# Patient Record
Sex: FEMALE | Race: WHITE
[De-identification: names, ages, dates, MRNs, and addresses within clinical notes are randomized per-mention and may not be internally consistent; named-entity substitution may affect disease eponyms.]

---

## 2018-09-25 ENCOUNTER — HOSPITAL ENCOUNTER (OUTPATIENT)
Dept: HOSPITAL 47 - LABPAT | Age: 63
End: 2018-09-25
Attending: ORTHOPAEDIC SURGERY
Payer: COMMERCIAL

## 2018-09-25 DIAGNOSIS — Z01.812: ICD-10-CM

## 2018-09-25 DIAGNOSIS — Z01.818: Primary | ICD-10-CM

## 2018-09-25 LAB
ALBUMIN SERPL-MCNC: 3.9 G/DL (ref 3.5–5)
ALP SERPL-CCNC: 82 U/L (ref 38–126)
ALT SERPL-CCNC: 18 U/L (ref 9–52)
ANION GAP SERPL CALC-SCNC: 6 MMOL/L
APTT BLD: 23 SEC (ref 22–30)
AST SERPL-CCNC: 25 U/L (ref 14–36)
BUN SERPL-SCNC: 14 MG/DL (ref 7–17)
CALCIUM SPEC-MCNC: 9.8 MG/DL (ref 8.4–10.2)
CHLORIDE SERPL-SCNC: 109 MMOL/L (ref 98–107)
CO2 SERPL-SCNC: 27 MMOL/L (ref 22–30)
ERYTHROCYTE [DISTWIDTH] IN BLOOD BY AUTOMATED COUNT: 4.91 M/UL (ref 3.8–5.4)
ERYTHROCYTE [DISTWIDTH] IN BLOOD: 12.9 % (ref 11.5–15.5)
GLUCOSE SERPL-MCNC: 99 MG/DL (ref 74–99)
HCT VFR BLD AUTO: 45.1 % (ref 34–46)
HGB BLD-MCNC: 14.9 GM/DL (ref 11.4–16)
INR PPP: 1.1 (ref ?–1.2)
MCH RBC QN AUTO: 30.4 PG (ref 25–35)
MCHC RBC AUTO-ENTMCNC: 33.1 G/DL (ref 31–37)
MCV RBC AUTO: 91.9 FL (ref 80–100)
PH UR: 6 [PH] (ref 5–8)
PLATELET # BLD AUTO: 235 K/UL (ref 150–450)
POTASSIUM SERPL-SCNC: 4.7 MMOL/L (ref 3.5–5.1)
PROT SERPL-MCNC: 6.8 G/DL (ref 6.3–8.2)
PT BLD: 10.5 SEC (ref 9–12)
SODIUM SERPL-SCNC: 142 MMOL/L (ref 137–145)
SP GR UR: 1.02 (ref 1–1.03)
UROBILINOGEN UR QL STRIP: <2 MG/DL (ref ?–2)
WBC # BLD AUTO: 6.4 K/UL (ref 3.8–10.6)

## 2018-09-25 PROCEDURE — 85027 COMPLETE CBC AUTOMATED: CPT

## 2018-09-25 PROCEDURE — 36415 COLL VENOUS BLD VENIPUNCTURE: CPT

## 2018-09-25 PROCEDURE — 85730 THROMBOPLASTIN TIME PARTIAL: CPT

## 2018-09-25 PROCEDURE — 80053 COMPREHEN METABOLIC PANEL: CPT

## 2018-09-25 PROCEDURE — 93005 ELECTROCARDIOGRAM TRACING: CPT

## 2018-09-25 PROCEDURE — 85610 PROTHROMBIN TIME: CPT

## 2018-09-25 PROCEDURE — 81003 URINALYSIS AUTO W/O SCOPE: CPT

## 2018-09-25 PROCEDURE — 87070 CULTURE OTHR SPECIMN AEROBIC: CPT

## 2018-10-04 ENCOUNTER — HOSPITAL ENCOUNTER (INPATIENT)
Dept: HOSPITAL 47 - 2ORMAIN | Age: 63
LOS: 1 days | Discharge: HOME HEALTH | DRG: 470 | End: 2018-10-05
Attending: ORTHOPAEDIC SURGERY | Admitting: ORTHOPAEDIC SURGERY
Payer: COMMERCIAL

## 2018-10-04 VITALS — BODY MASS INDEX: 32.3 KG/M2

## 2018-10-04 DIAGNOSIS — Z82.49: ICD-10-CM

## 2018-10-04 DIAGNOSIS — Z88.0: ICD-10-CM

## 2018-10-04 DIAGNOSIS — Z90.710: ICD-10-CM

## 2018-10-04 DIAGNOSIS — E78.5: ICD-10-CM

## 2018-10-04 DIAGNOSIS — Z79.899: ICD-10-CM

## 2018-10-04 DIAGNOSIS — M17.11: Primary | ICD-10-CM

## 2018-10-04 DIAGNOSIS — I95.9: ICD-10-CM

## 2018-10-04 DIAGNOSIS — E66.9: ICD-10-CM

## 2018-10-04 PROCEDURE — 0SRC0J9 REPLACEMENT OF RIGHT KNEE JOINT WITH SYNTHETIC SUBSTITUTE, CEMENTED, OPEN APPROACH: ICD-10-PCS

## 2018-10-04 PROCEDURE — 88300 SURGICAL PATH GROSS: CPT

## 2018-10-04 PROCEDURE — 85025 COMPLETE CBC W/AUTO DIFF WBC: CPT

## 2018-10-04 RX ADMIN — ASPIRIN 325 MG ORAL TABLET SCH MG: 325 PILL ORAL at 20:16

## 2018-10-04 RX ADMIN — CEFAZOLIN SCH GM: 10 INJECTION, POWDER, FOR SOLUTION INTRAVENOUS at 20:16

## 2018-10-04 RX ADMIN — DIPHENHYDRAMINE HYDROCHLORIDE PRN MG: 50 INJECTION, SOLUTION INTRAMUSCULAR; INTRAVENOUS at 20:16

## 2018-10-04 NOTE — P.CON
Consult Note





- .


Consult date: 10/04/18


Assessment/Plan:: 





Patient of Dr. Sarwat ABEBE was consulted for medical management, postoperative.





Patient is hypotensive/normotensive blood pressure 102/67 and 101/66, IV fluid 

changes 2.9 normal saline the same rate 75 mL an hour meanwhile discontinue the 

lactated Ringer.





Margie Stover 63 years old white female has 2 daughter and 1 son she came in 

electively for right total knee arthroplasty done by Dr. Eduar Lewis today 

subsequently consult for medical management.


Patient stated that she had arthritis in both knee but the right knee is worse 

and that is why she had the surgery.


She denied any history of diabetes mellitus, hypertension, hypotension, 

medication for any of other reasons.


ALLERGY to penicillin and amoxicillin.


No previous history of surgical intervention.  


She is .


Family history noncontributory.


Review of system negative in the 14 point.  Except the arthritis of the right 

knee.


On exam:


HEENT: Head was normocephalic and atraumatic,.  Pupil equal reactive 

conjunctiva pink sclera was nonicteric.  Normal hearing oropharynx natural 

teeth uvula midline, normal


Neck supple no JVD no thyromegaly no lymphadenopathy trachea midline.


Chest: Clear to auscultation and percussion


Heart regular sinus rhythm.


Abdomen is soft positive bowel sounds.


Extremities: Right total knee arthroplasty, left knee advanced degenerative 

arthritis.  No edema.


Vital sign at the time of exam temperature 98.3 pulse 65 rate 16 and blood 

pressure 102/67.  She is conscious alert oriented no dizziness or blurred 

vision.





Assessment and plan:


Blood pressure on the low side however is normal perfusing 102/67, plan change 

of the IV 2.9 normal saline 75 mL.


Check lab in the morning if wasn't ordered by Dr. recinos.


Anticoagulation.  The orthopedic surgeon.

## 2018-10-04 NOTE — XR
Limited right knee

 

HISTORY: Status post right knee arthroplasty

 

2 views of the right knee

 

Patient is status post right knee arthroplasty. There is anatomic alignment. Lucency present in the s
oft tissues is compatible with postop state. Ossific densities posterior to the distal femur and at t
he level of the posterior aspect of the femoral prosthesis are indeterminate, difficult to exclude lo
ose bodies.

 

IMPRESSION: Difficult to exclude loose bodies. Postop alignment is satisfactory.

 

A Yellow level critical message alert has been initiated for Eulogio Thurston via the Home-Account System on 10/4/2018 4:31 PM.  This message alert has been sent to Eulogio Thurston via 
e preferences provided by the clinician for the receipt of Radiology Critical Findings. Message ID [id number]
[id number].

## 2018-10-04 NOTE — OP
OPERATIVE REPORT



DATE OF PROCEDURE:

10/04/2018



PREOPERATIVE DIAGNOSIS:

Right knee osteoarthrosis.



POSTOPERATIVE DIAGNOSIS:

Right knee osteoarthrosis.



OPERATION:

Right total knee arthroplasty.



SURGEON:

Eduar Lewis MD



ASSISTANT:

Eulogio Thurston PA-C



ANESTHESIA:

Spinal with sedation.



ESTIMATED BLOOD LOSS:

100 mL.



TOURNIQUET TIME:

45 minutes at 250 mmHg.



COMPLICATIONS:

None apparent.



DRAINS:

None.



DISPOSITION:

Post-Anesthesia Care Unit.



INDICATIONS:

Margie is a very pleasant 63-year-old female with a long-standing history of right knee

pain.  History and physical examination are consistent with advanced right knee

osteoarthrosis.  She has been through significant non-operative management up to this

point.  Further treatment options were discussed and she decided to go forward with

right total knee arthroplasty.



The risks of the procedure were discussed with her in detail.  These risks include but

are not limited to risk of infection, nerve damage, bleeding, pain and risk of deep

vein thrombosis which could lead to fatal pulmonary embolism.  There is also a risk of

loosening of the implant which could require revision operation.  The patient

understands these risks.  All of her questions were answered to her satisfaction.

Appropriate informed consent was obtained.



DESCRIPTION OF THE PROCEDURE:

The patient was identified in the preoperative holding area.  Surgical site was marked

by both the patient and myself.  She was given 2 grams of Ancef IV for prophylactic

purposes.  She was then transferred to the operative suite.  She was placed supine on

the operating room table.  Spinal anesthetic was then administered and dosed per the

anesthesia department without apparent complication.



Examination under anesthesia was then performed.  The patient was 2-3 degrees shy of

full extension.  She had 100 degrees of flexion.  The medial collateral ligament,

lateral collateral ligament and posterior cruciate ligaments were stable.



Tourniquet was then placed high on the right upper thigh, well padded in preparation

for surgery.  The patient's right lower extremity was then prepped and draped in the

usual sterile fashion.  Standard surgical pause was then undertaken to ensure that we

were operating on the correct site and that appropriate preoperative antibiotics had

been given.  All staff in the room were in agreement and we proceeded.



The outlines of the patella were marked with a surgical pen.  A planned 12 cm vertical

incision centered over the patella was marked with a surgical pen.  The leg was

exsanguinated with an Esmarch dressing.  The knee was then flexed and the tourniquet

was inflated to 250 mmHg.  The total tourniquet time for the procedure was 45 minutes.



Incision was then made with a 10-blade scalpel.  Dissection was carried down sharply to

the overlying fascia.  Great care was taken to minimize the skin flaps.  The knee was

then exposed using a standard medial parapatellar approach.  A small cuff of quadriceps

tendon was then left for suturing.



She was in a bit of valgus preoperatively.  Very minimal medial release was then made.

This was done with just enough of a medial release for placement of the retractors.

The medial meniscus was then excised as well.  The lateral meniscus was also released

anteriorly.



The leg was then externally rotated.  The patella was everted and the knee was flexed.

The retractors were then placed to protect the collateral ligaments.



I then proceeded to remove the infrapatellar fat pad.  This was excised sharply

tangentially with the fibers of the patellar tendon.



I then proceeded to remove peripheral osteophytes.  This was done with a rongeur.  I

then proceeded with the distal femoral resection.  She did have near-full extension.

The planned 9 mm resection was then done.  The femoral canal was then entered in the

midline of the femur approximately 10 mm anterior to the origin of the posterior

cruciate ligament.  The debby was then advanced down the center of the femur and placed

intramedullary.



Based on the preoperative radiographs, the angle between the anatomic and the

mechanical axis of the femur was approximately 4-5 degrees.  The valgus angle of the

distal femoral cutting guide was then set at 4 degrees for the right knee.  The distal

femoral cutting guide was then advanced over the intramedullary debby.  This was seated

firmly against the femur.  I then, as mentioned, planned take 9 mm off the distal

femur.



The cutting block was then secured onto the femur with pins.  The jig was then removed

and the distal femoral cut was made through the slot of the block.  The pins were

removed and the distal femoral cutting block was removed.  The accuracy of the distal

femoral cuts was checked with 2 flat bars.



I then proceeded with femoral sizing.  Posterior referencing sizing guide was held

firmly against the resected distal surface of the femur.  The posterior condyles were

resting on the posterior plane of the guide.  The sizing stylus was then placed on the

anterior femur.  The size was measured as a size 7.



I then assessed for femoral rotation.  The plan was for 3 degrees of external rotation.

Three degrees of external rotation was placed onto the jig.  These holes were then

marked.  I then confirmed the rotation by 3 separate methods.  This was done using

epicondylar axis as well as Whitesides line and posterior referencing.  It was deemed

that the external rotation was proper.  I then went forward with placing the femoral

cutting block.  This was placed over the previously placed pin holes.  The Alexandro wing

was then placed on to the anterior slots to ensure that we would not notch the anterior

femur with the anterior femoral cut.  I then proceeded with the anterior femoral cut.

This was flush with the anterior cortex of the femur.  The posterior cuts were then

made followed by the anterior chamfer cut, then the posterior chamfer cut.  The cutting

block was then removed.  Throughout the resection, the collateral ligaments were

protected with retractors.  I then placed a trial size 7 femur.  It fit very nice

medial to lateral and fit flush with the distal end of the femur.  The drill holes were

then made.



I then proceeded with the tibial cut.  I planned for a cruciate-retaining knee.  The

guide was placed and set for varus, valgus and for slope.  The height was set for an

approximate 2 mm resection from the lateral tibial plateau, which was the lower side.

I was happy with the alignment and amount of resection.  The cutting block was then

pinned to the proximal tibia.  The alignment debby was removed and the proximal tibia was

resected with a reciprocating saw.  Again this was done with retractors protecting the

collateral ligaments as well as the posterior cruciate ligament.



I then proceeded to evaluate the flexion and extension gaps.  A 10 mm block was placed.

The flexion and extension gaps were equal.



I then proceeded with resection of posterior osteophytes.  She had very extensive

posterior osteophytes.  This was done using a curved osteotome.  This resected the

posterior osteophytes, and posterior capsule stripping was also done off the posterior

aspect of the femur at this time.  The osteophytes were then removed.



I then proceeded with resection of the patella.  The thickness of patella was measured

using a caliper.  The thickness was 22 mm.  The thickness of the anticipated patellar

dome was taken into account.  The resection was then performed and confirmed to be

equal in 4 quadrants using a caliper.  Approximately 14 mm of bone remained after the

resection.  A 32 x 8.5 mm standard patellar trial was then placed.  The holes were

drilled and the trial was then placed.



I then proceeded with sizing the tibial plate.  A size E tibial plate fit very nicely.

I then placed the trial femur, the tibial tray and the patellar button.  A 10 mm trial

tibial insert was also placed.  The components fit very nicely.  She had full extension

and flexion.  The extension and flexion gaps were equal and stable to both varus and

valgus stress.  The patella tracked appropriately.  The tibial tray rotation was marked

with a Bovie.  This was externally rotated properly.



I then proceed with tibial preparation.  I first drilled the femoral holes and removed

femoral component.  The tibial tray was then set for proper external rotation as well

as mediolateral placement onto the tibia.  It was then pinned into place.  I then

proceeded with punching the keel.



I then decided to proceed with cementing of all of our components.  The knee was

thoroughly irrigated with sterile saline solution via pulse lavage.  The lateral

geniculate artery was identified and cauterized.  All blood was removed from the bone

of the tibia, femur and patella via pulse lavage.  I then proceed with cementing.



Two packs of antibiotic bone cement were prepared on the back table by the surgical

tech.  I then proceeded with cementing the tibia first.  The cement was impacted into

the keel as well as deeply seated into the bone.  A second coat of cement was then

placed.  The tibia was then impacted into place.  Excess cement was removed with

Cushing's and jokers.



I then proceeded with cementing the femoral component.  The femoral component was also

cemented using standard technique.  Excess cement was removed.  A 10 mm trial insert

was then placed into the knee.  It was brought into full extension with a constant

axial load placed until the cement had hardened.



The patellar component was then cemented.  This was held firmly with a compressive

device until the cement had dried.



When the cement had dried, the knee was taken out of extension.  All excess cement was

removed from around the prosthesis.  I then trialed the knee with a 10 mm insert.  The

flexion and extension gaps were appropriate.



Polyethylene was then placed onto the tibial tray and locked into place.  The knee was

then reduced.  The knee was again further irrigated with sterile saline solution with

antibiotic added.  The tourniquet was then deflated.  The total tourniquet time for the

procedure was 45 minutes at 250 mmHg.  Final components were Shay Persona size 7

cruciate-retaining femoral component, a size E tibial tray, a 10 mm medial-congruent

cruciate-retaining polyethylene insert, and a 32 x 8.5 mm patella.



I then proceeded with closure.  Again the knee was thoroughly irrigated.  The

quadriceps tendon and the medial retinaculum were reapproximated with #2 Ethibond

suture.  The extensor mechanism was then closed with a running #2 Quill suture.



Subcutaneous tissues were closed with 2-0 Vicryl interrupted suture.  The skin was

closed with a running 3-0 Quill suture.  Dermabond was applied to the incision.



Sterile compressive dressing was then applied.  All sponge and needle counts were

deemed correct prior to closure.  The patient tolerated the procedure without apparent

complication.  She was transferred to the recovery room in stable condition.





MMODL / IJN: [id number] / Job#: [id number]

## 2018-10-05 VITALS — DIASTOLIC BLOOD PRESSURE: 66 MMHG | SYSTOLIC BLOOD PRESSURE: 97 MMHG

## 2018-10-05 VITALS — TEMPERATURE: 97.9 F | HEART RATE: 55 BPM

## 2018-10-05 VITALS — RESPIRATION RATE: 16 BRPM

## 2018-10-05 LAB
BASOPHILS # BLD AUTO: 0 K/UL (ref 0–0.2)
BASOPHILS NFR BLD AUTO: 0 %
EOSINOPHIL # BLD AUTO: 0.1 K/UL (ref 0–0.7)
EOSINOPHIL NFR BLD AUTO: 1 %
ERYTHROCYTE [DISTWIDTH] IN BLOOD BY AUTOMATED COUNT: 4.15 M/UL (ref 3.8–5.4)
ERYTHROCYTE [DISTWIDTH] IN BLOOD: 13 % (ref 11.5–15.5)
HCT VFR BLD AUTO: 38.4 % (ref 34–46)
HGB BLD-MCNC: 12.6 GM/DL (ref 11.4–16)
LYMPHOCYTES # SPEC AUTO: 1.8 K/UL (ref 1–4.8)
LYMPHOCYTES NFR SPEC AUTO: 13 %
MCH RBC QN AUTO: 30.4 PG (ref 25–35)
MCHC RBC AUTO-ENTMCNC: 32.9 G/DL (ref 31–37)
MCV RBC AUTO: 92.5 FL (ref 80–100)
MONOCYTES # BLD AUTO: 0.8 K/UL (ref 0–1)
MONOCYTES NFR BLD AUTO: 6 %
NEUTROPHILS # BLD AUTO: 10.9 K/UL (ref 1.3–7.7)
NEUTROPHILS NFR BLD AUTO: 80 %
PLATELET # BLD AUTO: 233 K/UL (ref 150–450)
WBC # BLD AUTO: 13.7 K/UL (ref 3.8–10.6)

## 2018-10-05 RX ADMIN — DIPHENHYDRAMINE HYDROCHLORIDE PRN MG: 50 INJECTION, SOLUTION INTRAMUSCULAR; INTRAVENOUS at 03:50

## 2018-10-05 RX ADMIN — CEFAZOLIN SCH GM: 10 INJECTION, POWDER, FOR SOLUTION INTRAVENOUS at 03:50

## 2018-10-05 RX ADMIN — ASPIRIN 325 MG ORAL TABLET SCH MG: 325 PILL ORAL at 09:26

## 2018-10-05 RX ADMIN — CEFAZOLIN SCH: 330 INJECTION, POWDER, FOR SOLUTION INTRAMUSCULAR; INTRAVENOUS at 05:50

## 2018-10-05 RX ADMIN — CEFAZOLIN SCH: 330 INJECTION, POWDER, FOR SOLUTION INTRAMUSCULAR; INTRAVENOUS at 09:26

## 2018-10-05 NOTE — P.PN
Progress Note - Text





Date: 10/5Time:650am





Patient is status post total knee replacement by Dr. Lewis. Patient seen this 

morning with VAS score of 0.no c/o of pruritus, no c/o nausea/vomiting, 

comfortable and doing well.

## 2018-10-05 NOTE — P.DS
Providers


Date of admission: 


10/04/18 11:53





Expected date of discharge: 10/05/18


Attending physician: 


Eduar Lewis





Consults: 





 





10/04/18 14:13


Consult Physician Routine 


   Consulting Provider: Albin Perales Reason/Comments: post op medical management


   Do you want consulting provider notified?: Yes











Primary care physician: 


Terrance Fam








- Discharge Diagnosis(es)


(1) Status post total right knee replacement


Keep wound clean and dry


Take meds as directed


Follow-up with Dr. Lewis in office


Weight bear as tolerated


May shower in 3 days if no bleeding





Current Visit: Yes   Status: Acute   Priority: Medium   


Procedures: 


Right TKA





Patient Condition at Discharge: Good





Plan - Discharge Summary


New Discharge Prescriptions: 


New


   Aspirin 325 mg PO BID #60 tab


   Docusate [Colace] 100 mg PO BID #60 capsule


   HYDROcodone/APAP 7.5-325MG [Norco 7.5-325] 1 - 2 each PO Q6HR PRN #56 tab


     PRN Reason: Pain





No Action


   Magnesium 200 mg PO DAILY


Discharge Medication List





Magnesium 200 mg PO DAILY 10/04/18 [History]


Aspirin 325 mg PO BID #60 tab 10/05/18 [Rx]


Docusate [Colace] 100 mg PO BID #60 capsule 10/05/18 [Rx]


HYDROcodone/APAP 7.5-325MG [Norco 7.5-325] 1 - 2 each PO Q6HR PRN #56 tab 10/05/

18 [Rx]








Follow up Appointment(s)/Referral(s): 


Eduar Lewis MD [STAFF PHYSICIAN] - 10/15/18 1:00 pm


Activity/Diet/Wound Care/Special Instructions: 


Keep wound clean and dry


Take meds as directed


Follow-up with Dr. Lewis in office


Weight bear as tolerated


May shower in 3 days if no bleeding





Discharge Disposition: HOME SELF-CARE

## 2020-11-06 ENCOUNTER — HOSPITAL ENCOUNTER (OUTPATIENT)
Dept: HOSPITAL 47 - LABPAT | Age: 65
Discharge: HOME | End: 2020-11-06
Attending: OBSTETRICS & GYNECOLOGY
Payer: MEDICARE

## 2020-11-06 DIAGNOSIS — Z01.818: Primary | ICD-10-CM

## 2020-11-06 LAB
ANION GAP SERPL CALC-SCNC: 6 MMOL/L
BASOPHILS # BLD AUTO: 0.1 K/UL (ref 0–0.2)
BASOPHILS NFR BLD AUTO: 1 %
BUN SERPL-SCNC: 11 MG/DL (ref 7–17)
CALCIUM SPEC-MCNC: 10 MG/DL (ref 8.4–10.2)
CHLORIDE SERPL-SCNC: 108 MMOL/L (ref 98–107)
CO2 SERPL-SCNC: 28 MMOL/L (ref 22–30)
EOSINOPHIL # BLD AUTO: 0.1 K/UL (ref 0–0.7)
EOSINOPHIL NFR BLD AUTO: 2 %
ERYTHROCYTE [DISTWIDTH] IN BLOOD BY AUTOMATED COUNT: 5.17 M/UL (ref 3.8–5.4)
ERYTHROCYTE [DISTWIDTH] IN BLOOD: 12.9 % (ref 11.5–15.5)
GLUCOSE SERPL-MCNC: 101 MG/DL (ref 74–99)
HCT VFR BLD AUTO: 48.8 % (ref 34–46)
HGB BLD-MCNC: 15.5 GM/DL (ref 11.4–16)
LYMPHOCYTES # SPEC AUTO: 2.6 K/UL (ref 1–4.8)
LYMPHOCYTES NFR SPEC AUTO: 41 %
MCH RBC QN AUTO: 30.1 PG (ref 25–35)
MCHC RBC AUTO-ENTMCNC: 31.9 G/DL (ref 31–37)
MCV RBC AUTO: 94.4 FL (ref 80–100)
MONOCYTES # BLD AUTO: 0.4 K/UL (ref 0–1)
MONOCYTES NFR BLD AUTO: 7 %
NEUTROPHILS # BLD AUTO: 3 K/UL (ref 1.3–7.7)
NEUTROPHILS NFR BLD AUTO: 47 %
PLATELET # BLD AUTO: 258 K/UL (ref 150–450)
POTASSIUM SERPL-SCNC: 4.6 MMOL/L (ref 3.5–5.1)
SODIUM SERPL-SCNC: 142 MMOL/L (ref 137–145)
WBC # BLD AUTO: 6.3 K/UL (ref 3.8–10.6)

## 2020-11-06 PROCEDURE — 93005 ELECTROCARDIOGRAM TRACING: CPT

## 2020-11-06 PROCEDURE — 85025 COMPLETE CBC W/AUTO DIFF WBC: CPT

## 2020-11-06 PROCEDURE — 86900 BLOOD TYPING SEROLOGIC ABO: CPT

## 2020-11-06 PROCEDURE — 80048 BASIC METABOLIC PNL TOTAL CA: CPT

## 2020-11-06 PROCEDURE — 86850 RBC ANTIBODY SCREEN: CPT

## 2020-11-06 PROCEDURE — 86901 BLOOD TYPING SEROLOGIC RH(D): CPT

## 2020-11-06 PROCEDURE — 36415 COLL VENOUS BLD VENIPUNCTURE: CPT

## 2020-11-15 NOTE — P.HPOB
History of Present Illness


H&P Date: 11/15/20


Chief Complaint: Cystocele, rectocele





This is a 65 y.o. female,  4, para 3, who presents for anterior and 

posterior vaginal repair due to symptomatic cystocele and rectocele.  She 

complains of vaginal mass, pressure, constipation, urinary hesitancy and some 

urinary incontinence.  She saw Dr. Musa who did urodynamic testing and she did

not leak even when cystocele was reduced.  She declined a sling.





OB Hx:  .  History of 3 vaginal deliveries. 1 miscarriage.


Gyn Hx:  No history of STDs. 


Social Hx:  .  Retired.








Review of Systems


Constitutional: Denies chills, Denies fever


Eyes: denies blurred vision, denies pain


Ears, nose, mouth and throat: Denies headache, Denies sore throat


Cardiovascular: Denies chest pain, Denies shortness of breath


Respiratory: Denies cough


Gastrointestinal: Reports diarrhea (occ), Denies abdominal pain, Denies nausea, 

Denies vomiting


Genitourinary: Reports prolapse symptoms, Reports stress incontinence, Reports 

urgency


Menstruation: Reports post hysterectomy, Reports postmenopausal


Musculoskeletal: Reports myalgias


Integumentary: Denies pruritus, Denies rash


Neurological: Denies numbness, Denies weakness


Psychiatric: Denies anxiety, Denies depression


Endocrine: Denies fatigue





Past Medical History


Past Medical History: Hyperlipidemia, Osteoarthritis (OA)


History of Any Multi-Drug Resistant Organisms: None Reported


Past Surgical History: Hysterectomy, Joint Replacement


Additional Past Surgical History / Comment(s): rt foot bunionectomy.  rt knee 

replacement


Past Anesthesia/Blood Transfusion Reactions: Previous Problems w/ Anesthesia, 

Motion Sickness


Additional Past Anesthesia/Blood Transfusion Reaction / Comment(s): hard time 

waking up


Past Psychological History: No Psychological Hx Reported


Smoking Status: Never smoker


Past Alcohol Use History: Occasional


Past Drug Use History: None Reported





- Past Family History


  ** Father


Family Medical History: Deep Vein Thrombosis (DVT)





  ** Son(s)


Family Medical History: Cancer





Medications and Allergies


                                Home Medications











 Medication  Instructions  Recorded  Confirmed  Type


 


Magnesium 200 mg PO DAILY 10/04/18 11/16/20 History


 


Atorvastatin [Lipitor] 10 mg PO DAILY 20 History


 


Cannabidiol (Cbd) [Epidiolex] 1 dose PO DAILY PRN 20 History


 


Cholecalciferol [Vitamin D3 (25 3,000 unit PO DAILY 20 History





Mcg = 1000 Iu)]    


 


L.acidoph,Paracasei, B.lactis 1 each PO DAILY 20 History





[Probiotic]    


 


Turmeric Root Extract [Turmeric] 500 mg PO DAILY 20 History








                                    Allergies











Allergy/AdvReac Type Severity Reaction Status Date / Time


 


amoxicillin Allergy  Rash/Hives Verified 20 06:22


 


Penicillins Allergy  Rash/Hives Verified 20 06:22














Exam


Osteopathic Statement: *.  No significant issues noted on an osteopathic 

structural exam other than those noted in the History and Physical/Consult.





HEENT:  within normal limits


Heart:  regular rate and rhythm


Lungs:  clear to auscultation bilaterally


Abdomen:  soft, non-tender


Pelvic:  grade 3 cystocele, protruding out of vagina 1".  1st degree rectocele. 

 No adnexal masses or tenderness.


Extremities:  Neg. Anila's.





Assessment and Plan


(1) Cystocele with rectocele


Current Visit: No   Status: Acute   Code(s): N81.10 - CYSTOCELE, UNSPECIFIED; 

N81.6 - RECTOCELE   SNOMED Code(s): 867784674


   


Plan: 





Proceed with anterior and posterior vaginal repair.





I have discussed the risks, benefits, and alternative therapies for the above-

mentioned procedure and for both sedation/anesthesia as well as necessary blood 

products administration, if indicated, as they pertain to this patient.  The 

patient has indicated her understanding and acceptance of the risks and 

procedures discussed.

## 2020-11-16 ENCOUNTER — HOSPITAL ENCOUNTER (OUTPATIENT)
Dept: HOSPITAL 47 - OR | Age: 65
LOS: 1 days | Discharge: HOME | End: 2020-11-17
Attending: OBSTETRICS & GYNECOLOGY
Payer: MEDICARE

## 2020-11-16 VITALS — BODY MASS INDEX: 30.9 KG/M2

## 2020-11-16 DIAGNOSIS — M19.90: ICD-10-CM

## 2020-11-16 DIAGNOSIS — Z90.710: ICD-10-CM

## 2020-11-16 DIAGNOSIS — N81.10: Primary | ICD-10-CM

## 2020-11-16 DIAGNOSIS — Z82.49: ICD-10-CM

## 2020-11-16 DIAGNOSIS — N81.6: ICD-10-CM

## 2020-11-16 DIAGNOSIS — Z96.651: ICD-10-CM

## 2020-11-16 DIAGNOSIS — E78.5: ICD-10-CM

## 2020-11-16 DIAGNOSIS — Z98.890: ICD-10-CM

## 2020-11-16 DIAGNOSIS — Z88.0: ICD-10-CM

## 2020-11-16 DIAGNOSIS — Z80.9: ICD-10-CM

## 2020-11-16 DIAGNOSIS — Z79.899: ICD-10-CM

## 2020-11-16 PROCEDURE — 88302 TISSUE EXAM BY PATHOLOGIST: CPT

## 2020-11-16 PROCEDURE — 57260 CMBN ANT PST COLPRHY: CPT

## 2020-11-16 PROCEDURE — 86901 BLOOD TYPING SEROLOGIC RH(D): CPT

## 2020-11-16 PROCEDURE — 86900 BLOOD TYPING SEROLOGIC ABO: CPT

## 2020-11-16 PROCEDURE — 86850 RBC ANTIBODY SCREEN: CPT

## 2020-11-16 PROCEDURE — 85025 COMPLETE CBC W/AUTO DIFF WBC: CPT

## 2020-11-16 RX ADMIN — KETOROLAC TROMETHAMINE PRN MG: 15 INJECTION, SOLUTION INTRAMUSCULAR; INTRAVENOUS at 11:00

## 2020-11-16 RX ADMIN — DOCUSATE SODIUM AND SENNOSIDES SCH: 50; 8.6 TABLET ORAL at 12:11

## 2020-11-16 RX ADMIN — POTASSIUM CHLORIDE SCH MLS/HR: 14.9 INJECTION, SOLUTION INTRAVENOUS at 11:41

## 2020-11-16 RX ADMIN — DOCUSATE SODIUM AND SENNOSIDES SCH EACH: 50; 8.6 TABLET ORAL at 20:53

## 2020-11-16 RX ADMIN — ATORVASTATIN CALCIUM SCH: 10 TABLET, FILM COATED ORAL at 12:11

## 2020-11-16 RX ADMIN — POTASSIUM CHLORIDE SCH MLS/HR: 14.9 INJECTION, SOLUTION INTRAVENOUS at 20:54

## 2020-11-16 RX ADMIN — POTASSIUM CHLORIDE SCH MLS: 14.9 INJECTION, SOLUTION INTRAVENOUS at 06:42

## 2020-11-16 RX ADMIN — KETOROLAC TROMETHAMINE PRN MG: 15 INJECTION, SOLUTION INTRAMUSCULAR; INTRAVENOUS at 18:42

## 2020-11-16 NOTE — P.OP
Date of Procedure: 20


Preoperative Diagnosis: 


Cystocele with rectocele


Postoperative Diagnosis: 


Same


Procedure(s) Performed: 


Anterior and posterior vaginal colporrhaphy


Anesthesia: YESICA


Surgeon: Page Bahena


Assistant #1: Davidson Diaz


Estimated Blood Loss (ml): 20


Pathology: other (Vaginal mucosa)


Condition: stable


Disposition: floor


Indications for Procedure: 


This is a 65 y.o. female,  4, para 3, who presents for anterior and 

posterior vaginal repair due to symptomatic cystocele and rectocele.  She 

complains of vaginal mass, pressure, constipation, urinary hesitancy and some 

urinary incontinence.  She saw Dr. Musa who did urodynamic testing and she did

not leak even when cystocele was reduced.  She declined a sling.


Operative Findings: 


Grade 3 cystocele and grade 1 rectocele/enterocele are noted


Description of Procedure: 


The patient is taken to the operating room where she is placed in the dorsal 

lithotomy position.  She is prepped and draped in the normal sterile fashion.  A

weighted speculum was placed in the patient's vagina.  A right angle retractor 

is used to visualize the vaginal cuff.  2 Allis clamps are used to grasp the 

vaginal cuff.  Injection of 1% lidocaine with epinephrine is injected underneath

the vaginal mucosa upwards towards the urethra.  A transverse incision is cut 

with the scalpel between the 2 Allis clamps.  Metzenbaum scissors are used to 

dissect underneath the vaginal mucosa upwards towards the urethra.  Edges of the

vaginal cuff were held with Allis clamps.  The cystocele is dissected away from 

the vaginal mucosa with sharp dissection.  The cystocele is reduced with gauze. 

0 Vicryl suture is placed on either side of the cystocele in interrupted 

figure-of-eight fashion.  The cystocele is reduced, the edges of the vaginal 

mucosa are trimmed with Metzenbaum scissors.  The vaginal mucosa was then 

sutured with 0 Vicryl suture in a running locked fashion to the cuff and tied.  

Olaery catheter is inserted and clear urine is noted.  Next the introitus is 

grasped at the 4 and 8 o'clock position with Allis clamps.  Injection of the 

same epinephrine solution is injected underneath the vaginal mucosa upwards 

towards the vaginal cuff.  A triangle a piece of tissue is removed from the 

perineum with a scalpel and then Metzenbaum scissors are used to dissect 

underneath the vaginal mucosa upwards towards the vaginal cuff.  The edges of 

the vaginal mucosa are held with Allis clamps.  The rectocele is reduced with 

sharp and blunt dissection area 0 Vicryl suture is placed in figure-of-eight 

stitches to reduce the rectocele.  Next the edges of the vaginal mucosa are 

trimmed with Metzenbaum scissors.  0 Vicryl suture is used to close the incision

in a running locked fashion up towards the introitus.  It is then brought 

underneath the vaginal mucosa and whipstitched along the perineum and then 

brought back up in a subcuticular fashion up to the introitus and tied.  

Excellent hemostasis is noted.  The vagina is then packed with one-inch iodoform

gauze with bacitracin ointment.  Oleary catheter is attached.  Again clear urine 

is noted.  All sponge and needle counts are correct.  The patient is then taken 

to recovery room in stable condition.

## 2020-11-17 VITALS
HEART RATE: 61 BPM | SYSTOLIC BLOOD PRESSURE: 109 MMHG | DIASTOLIC BLOOD PRESSURE: 74 MMHG | TEMPERATURE: 97.6 F | RESPIRATION RATE: 15 BRPM

## 2020-11-17 LAB
BASOPHILS # BLD AUTO: 0 K/UL (ref 0–0.2)
BASOPHILS NFR BLD AUTO: 0 %
EOSINOPHIL # BLD AUTO: 0 K/UL (ref 0–0.7)
EOSINOPHIL NFR BLD AUTO: 0 %
ERYTHROCYTE [DISTWIDTH] IN BLOOD BY AUTOMATED COUNT: 4.21 M/UL (ref 3.8–5.4)
ERYTHROCYTE [DISTWIDTH] IN BLOOD: 13.5 % (ref 11.5–15.5)
HCT VFR BLD AUTO: 38.5 % (ref 34–46)
HGB BLD-MCNC: 12.6 GM/DL (ref 11.4–16)
LYMPHOCYTES # SPEC AUTO: 3.2 K/UL (ref 1–4.8)
LYMPHOCYTES NFR SPEC AUTO: 32 %
MCH RBC QN AUTO: 29.9 PG (ref 25–35)
MCHC RBC AUTO-ENTMCNC: 32.7 G/DL (ref 31–37)
MCV RBC AUTO: 91.3 FL (ref 80–100)
MONOCYTES # BLD AUTO: 0.6 K/UL (ref 0–1)
MONOCYTES NFR BLD AUTO: 6 %
NEUTROPHILS # BLD AUTO: 5.9 K/UL (ref 1.3–7.7)
NEUTROPHILS NFR BLD AUTO: 60 %
PLATELET # BLD AUTO: 188 K/UL (ref 150–450)
WBC # BLD AUTO: 9.8 K/UL (ref 3.8–10.6)

## 2020-11-17 RX ADMIN — ATORVASTATIN CALCIUM SCH MG: 10 TABLET, FILM COATED ORAL at 08:00

## 2020-11-17 NOTE — P.DS
Providers


Date of admission: 





11/16/2020


Expected date of discharge: 11/17/20


Attending physician: 


Page Bahena





Primary care physician: 


Petty Bojorquez MD








- Discharge Diagnosis(es)


(1) Cystocele with rectocele


Current Visit: No   Status: Acute   


Hospital Course: 





This is a 65-year-old female who presented for anterior and posterior vaginal 

colporrhaphy.  She underwent the above procedure on 11/16/2020.  Postoperatively

she did have a headache the first night but she is doing better this morning.  

She denies any pain currently.  She is not taking any narcotic.  She has only 

used Motrin.  She has not urinated yet but her catheter that was just removed 

this morning.  She denies any flatus or bowel movement.  She has been ambulating

without difficulty.  She is tolerating regular diet.  Vital signs are stable.  

Abdomen is soft with positive bowel sounds 4.  Ginger-pad shows scant 

serosanguineous discharge.  Impression is status post anterior and posterior 

vaginal colporrhaphy postoperative day #1.  Plan is to discharge home later 

today as long as she is able to urinate with low residual.  She will be given a 

prescription for ibuprofen 600 mg as needed.  She also is advised follow-up in 

the office in approximately 1 week for postoperative check.  Routine 

postoperative instructions are given.  She is advised to call the office if she 

has any further questions or concerns prior to her appointment time.  


Procedures: 





Anterior and posterior vaginal colporrhaphy on 11/16/2020


Patient Condition at Discharge: Stable





Plan - Discharge Summary


Discharge Rx Participant: No


New Discharge Prescriptions: 


New


   Ibuprofen [Motrin] 600 mg PO Q6HR PRN #60 tab


     PRN Reason: Mild Discomfort





No Action


   Magnesium 200 mg PO DAILY


   L.acidoph,Paracasei, B.lactis [Probiotic] 1 each PO DAILY


   Cholecalciferol [Vitamin D3 (25 Mcg = 1000 Iu)] 3,000 unit PO DAILY


   Atorvastatin [Lipitor] 10 mg PO DAILY


   Turmeric Root Extract [Turmeric] 500 mg PO DAILY


   Cannabidiol (Cbd) [Epidiolex] 1 dose PO DAILY PRN


     PRN Reason: Pain


Discharge Medication List





Magnesium 200 mg PO DAILY 10/04/18 [History]


Atorvastatin [Lipitor] 10 mg PO DAILY 11/13/20 [History]


Cannabidiol (Cbd) [Epidiolex] 1 dose PO DAILY PRN 11/13/20 [History]


Cholecalciferol [Vitamin D3 (25 Mcg = 1000 Iu)] 3,000 unit PO DAILY 11/13/20 

[History]


L.acidoph,Paracasei, B.lactis [Probiotic] 1 each PO DAILY 11/13/20 [History]


Turmeric Root Extract [Turmeric] 500 mg PO DAILY 11/13/20 [History]


Ibuprofen [Motrin] 600 mg PO Q6HR PRN #60 tab 11/17/20 [Rx]








Follow up Appointment(s)/Referral(s): 


Page Bahena DO [Doctor of Osteopathic Medicine] - 1 Week


Activity/Diet/Wound Care/Special Instructions: 


No intercourse for 6 weeks.  May shower, but no tub baths for 1 week.  No 

lifting, bending, stooping, pushing.  Light activity.  Diet as tolerated.


Discharge Disposition: HOME SELF-CARE

## 2021-12-03 ENCOUNTER — HOSPITAL ENCOUNTER (OUTPATIENT)
Dept: HOSPITAL 47 - LABWHC1 | Age: 66
Discharge: HOME | End: 2021-12-03
Attending: INTERNAL MEDICINE
Payer: MEDICARE

## 2021-12-03 DIAGNOSIS — E78.5: ICD-10-CM

## 2021-12-03 DIAGNOSIS — D64.9: Primary | ICD-10-CM

## 2021-12-03 DIAGNOSIS — R32: ICD-10-CM

## 2021-12-03 DIAGNOSIS — K58.9: ICD-10-CM

## 2021-12-03 LAB
ALBUMIN SERPL-MCNC: 4.3 G/DL (ref 3.8–4.9)
ALBUMIN/GLOB SERPL: 1.87 G/DL (ref 1.6–3.17)
ALP SERPL-CCNC: 89 U/L (ref 41–126)
ALT SERPL-CCNC: 25 U/L (ref 8–44)
ANION GAP SERPL CALC-SCNC: 10.2 MMOL/L (ref 10–18)
AST SERPL-CCNC: 22 U/L (ref 13–35)
BUN SERPL-SCNC: 24.2 MG/DL (ref 9–27)
BUN/CREAT SERPL: 30.25 RATIO (ref 12–20)
CALCIUM SPEC-MCNC: 10 MG/DL (ref 8.7–10.3)
CHLORIDE SERPL-SCNC: 107 MMOL/L (ref 96–109)
CHOLEST SERPL-MCNC: 193 MG/DL (ref 0–200)
CO2 SERPL-SCNC: 25.8 MMOL/L (ref 20–27.5)
ERYTHROCYTE [DISTWIDTH] IN BLOOD BY AUTOMATED COUNT: 4.82 X 10*6/UL (ref 4.1–5.2)
ERYTHROCYTE [DISTWIDTH] IN BLOOD: 13.5 % (ref 11.5–14.5)
FERRITIN SERPL-MCNC: 42.5 NG/ML (ref 10–291)
GLOBULIN SER CALC-MCNC: 2.3 G/DL (ref 1.6–3.3)
GLUCOSE SERPL-MCNC: 99 MG/DL (ref 70–110)
HCT VFR BLD AUTO: 46.1 % (ref 37.2–46.3)
HDLC SERPL-MCNC: 72.9 MG/DL (ref 40–60)
HGB BLD-MCNC: 14.2 G/DL (ref 12–15)
IRON SERPL-MCNC: 55 UG/DL (ref 50–170)
LDLC SERPL CALC-MCNC: 99.1 MG/DL (ref 0–131)
MCH RBC QN AUTO: 29.5 PG (ref 27–32)
MCHC RBC AUTO-ENTMCNC: 30.8 G/DL (ref 32–37)
MCV RBC AUTO: 95.6 FL (ref 80–97)
PH UR: 6.5 [PH] (ref 5–8)
PLATELET # BLD AUTO: 231 X 10*3/UL (ref 140–440)
POTASSIUM SERPL-SCNC: 5.3 MMOL/L (ref 3.5–5.5)
PROT SERPL-MCNC: 6.6 G/DL (ref 6.2–8.2)
SODIUM SERPL-SCNC: 143 MMOL/L (ref 135–145)
SP GR UR: 1.02 (ref 1–1.03)
TIBC SERPL-MCNC: 454 UG/DL (ref 228–460)
TRIGL SERPL-MCNC: 105 MG/DL (ref 0–149)
UROBILINOGEN UR QL STRIP: <2 MG/DL (ref ?–2)
VIT B12 SERPL-MCNC: 502 PG/ML (ref 200–944)
VLDLC SERPL CALC-MCNC: 21 MG/DL (ref 5–40)
WBC # BLD AUTO: 7.55 X 10*3/UL (ref 4.5–10)

## 2021-12-03 PROCEDURE — 83550 IRON BINDING TEST: CPT

## 2021-12-03 PROCEDURE — 82747 ASSAY OF FOLIC ACID RBC: CPT

## 2021-12-03 PROCEDURE — 84443 ASSAY THYROID STIM HORMONE: CPT

## 2021-12-03 PROCEDURE — 80053 COMPREHEN METABOLIC PANEL: CPT

## 2021-12-03 PROCEDURE — 81003 URINALYSIS AUTO W/O SCOPE: CPT

## 2021-12-03 PROCEDURE — 85027 COMPLETE CBC AUTOMATED: CPT

## 2021-12-03 PROCEDURE — 83540 ASSAY OF IRON: CPT

## 2021-12-03 PROCEDURE — 82607 VITAMIN B-12: CPT

## 2021-12-03 PROCEDURE — 80061 LIPID PANEL: CPT

## 2021-12-03 PROCEDURE — 36415 COLL VENOUS BLD VENIPUNCTURE: CPT

## 2021-12-03 PROCEDURE — 82728 ASSAY OF FERRITIN: CPT

## 2022-07-15 ENCOUNTER — HOSPITAL ENCOUNTER (INPATIENT)
Dept: HOSPITAL 47 - EC | Age: 67
LOS: 4 days | Discharge: HOME | DRG: 872 | End: 2022-07-19
Attending: INTERNAL MEDICINE | Admitting: INTERNAL MEDICINE
Payer: MEDICARE

## 2022-07-15 VITALS — BODY MASS INDEX: 30.3 KG/M2

## 2022-07-15 DIAGNOSIS — Z88.0: ICD-10-CM

## 2022-07-15 DIAGNOSIS — N83.202: ICD-10-CM

## 2022-07-15 DIAGNOSIS — Z20.822: ICD-10-CM

## 2022-07-15 DIAGNOSIS — J45.909: ICD-10-CM

## 2022-07-15 DIAGNOSIS — M19.90: ICD-10-CM

## 2022-07-15 DIAGNOSIS — E78.5: ICD-10-CM

## 2022-07-15 DIAGNOSIS — Z88.1: ICD-10-CM

## 2022-07-15 DIAGNOSIS — Z79.899: ICD-10-CM

## 2022-07-15 DIAGNOSIS — Z90.710: ICD-10-CM

## 2022-07-15 DIAGNOSIS — A41.9: Primary | ICD-10-CM

## 2022-07-15 DIAGNOSIS — K57.20: ICD-10-CM

## 2022-07-15 DIAGNOSIS — G89.29: ICD-10-CM

## 2022-07-15 LAB
ALBUMIN SERPL-MCNC: 3.9 G/DL (ref 3.5–5)
ALP SERPL-CCNC: 93 U/L (ref 38–126)
ALT SERPL-CCNC: 35 U/L (ref 4–34)
AMYLASE SERPL-CCNC: 47 U/L (ref 30–110)
ANION GAP SERPL CALC-SCNC: 10 MMOL/L
AST SERPL-CCNC: 39 U/L (ref 14–36)
BASOPHILS # BLD AUTO: 0.1 K/UL (ref 0–0.2)
BASOPHILS NFR BLD AUTO: 0 %
BUN SERPL-SCNC: 8 MG/DL (ref 7–17)
CALCIUM SPEC-MCNC: 9.4 MG/DL (ref 8.4–10.2)
CHLORIDE SERPL-SCNC: 101 MMOL/L (ref 98–107)
CO2 SERPL-SCNC: 24 MMOL/L (ref 22–30)
EOSINOPHIL # BLD AUTO: 0.1 K/UL (ref 0–0.7)
EOSINOPHIL NFR BLD AUTO: 1 %
ERYTHROCYTE [DISTWIDTH] IN BLOOD BY AUTOMATED COUNT: 4.65 M/UL (ref 3.8–5.4)
ERYTHROCYTE [DISTWIDTH] IN BLOOD: 14 % (ref 11.5–15.5)
GLUCOSE SERPL-MCNC: 106 MG/DL (ref 74–99)
HCT VFR BLD AUTO: 41.4 % (ref 34–46)
HGB BLD-MCNC: 13.6 GM/DL (ref 11.4–16)
LIPASE SERPL-CCNC: 93 U/L (ref 23–300)
LYMPHOCYTES # SPEC AUTO: 1.6 K/UL (ref 1–4.8)
LYMPHOCYTES NFR SPEC AUTO: 12 %
MAGNESIUM SPEC-SCNC: 2.2 MG/DL (ref 1.6–2.3)
MCH RBC QN AUTO: 29.2 PG (ref 25–35)
MCHC RBC AUTO-ENTMCNC: 32.8 G/DL (ref 31–37)
MCV RBC AUTO: 89 FL (ref 80–100)
MONOCYTES # BLD AUTO: 0.9 K/UL (ref 0–1)
MONOCYTES NFR BLD AUTO: 7 %
NEUTROPHILS # BLD AUTO: 10.2 K/UL (ref 1.3–7.7)
NEUTROPHILS NFR BLD AUTO: 78 %
PH UR: 6.5 [PH] (ref 5–8)
PLATELET # BLD AUTO: 323 K/UL (ref 150–450)
POTASSIUM SERPL-SCNC: 4.5 MMOL/L (ref 3.5–5.1)
PROT SERPL-MCNC: 7 G/DL (ref 6.3–8.2)
SODIUM SERPL-SCNC: 135 MMOL/L (ref 137–145)
SP GR UR: 1.04 (ref 1–1.03)
UROBILINOGEN UR QL STRIP: <2 MG/DL (ref ?–2)
WBC # BLD AUTO: 13.1 K/UL (ref 3.8–10.6)

## 2022-07-15 PROCEDURE — 83605 ASSAY OF LACTIC ACID: CPT

## 2022-07-15 PROCEDURE — 80053 COMPREHEN METABOLIC PANEL: CPT

## 2022-07-15 PROCEDURE — 96366 THER/PROPH/DIAG IV INF ADDON: CPT

## 2022-07-15 PROCEDURE — 94760 N-INVAS EAR/PLS OXIMETRY 1: CPT

## 2022-07-15 PROCEDURE — 96361 HYDRATE IV INFUSION ADD-ON: CPT

## 2022-07-15 PROCEDURE — 96375 TX/PRO/DX INJ NEW DRUG ADDON: CPT

## 2022-07-15 PROCEDURE — 96365 THER/PROPH/DIAG IV INF INIT: CPT

## 2022-07-15 PROCEDURE — 74177 CT ABD & PELVIS W/CONTRAST: CPT

## 2022-07-15 PROCEDURE — 83735 ASSAY OF MAGNESIUM: CPT

## 2022-07-15 PROCEDURE — 87635 SARS-COV-2 COVID-19 AMP PRB: CPT

## 2022-07-15 PROCEDURE — 80048 BASIC METABOLIC PNL TOTAL CA: CPT

## 2022-07-15 PROCEDURE — 87502 INFLUENZA DNA AMP PROBE: CPT

## 2022-07-15 PROCEDURE — 74022 RADEX COMPL AQT ABD SERIES: CPT

## 2022-07-15 PROCEDURE — 99285 EMERGENCY DEPT VISIT HI MDM: CPT

## 2022-07-15 PROCEDURE — 36415 COLL VENOUS BLD VENIPUNCTURE: CPT

## 2022-07-15 PROCEDURE — 84145 PROCALCITONIN (PCT): CPT

## 2022-07-15 PROCEDURE — 86738 MYCOPLASMA ANTIBODY: CPT

## 2022-07-15 PROCEDURE — 81003 URINALYSIS AUTO W/O SCOPE: CPT

## 2022-07-15 PROCEDURE — 87040 BLOOD CULTURE FOR BACTERIA: CPT

## 2022-07-15 PROCEDURE — 85025 COMPLETE CBC W/AUTO DIFF WBC: CPT

## 2022-07-15 PROCEDURE — 82150 ASSAY OF AMYLASE: CPT

## 2022-07-15 PROCEDURE — 83690 ASSAY OF LIPASE: CPT

## 2022-07-15 RX ADMIN — CEFAZOLIN SCH MLS/HR: 330 INJECTION, POWDER, FOR SOLUTION INTRAMUSCULAR; INTRAVENOUS at 20:06

## 2022-07-15 RX ADMIN — METRONIDAZOLE SCH MLS/HR: 500 INJECTION, SOLUTION INTRAVENOUS at 20:07

## 2022-07-15 RX ADMIN — MORPHINE SULFATE PRN MG: 4 INJECTION, SOLUTION INTRAMUSCULAR; INTRAVENOUS at 21:45

## 2022-07-15 NOTE — XR
EXAMINATION TYPE: XR abdomen acute w cxr

 

DATE OF EXAM: 7/15/2022

 

COMPARISON: NONE

 

HISTORY: Shortness of breath

 

TECHNIQUE:  Frontal and lateral views of the chest are obtained.

 

FINDINGS:

 

Scattered senescent parenchymal changes noted. Hyperinflation compatible with COPD. 

 

No evidence for infiltrate. No evidence for atelectasis.

 

Heart size is stable.

 

Mediastinal structures are stable and grossly unremarkable.

 

No evidence for hilar prominence.

 

Degenerative changes dorsal spine. 

 

IMPRESSION:

1. No evidence for acute pulmonary disease.

## 2022-07-15 NOTE — ED
Abdominal Pain HPI





- General


Chief Complaint: Abdominal Pain


Stated Complaint: abd pain/cough


Time Seen by Provider: 07/15/22 16:01


Source: patient, RN notes reviewed


Mode of arrival: ambulatory


Limitations: no limitations





- History of Present Illness


Initial Comments: 





Latrice iglesias 66-year-old female presents emergency parents complaining of a

dry cough which is impressive for 1 month since the patient got back from 

[place].  Patient to go home COVID-19 test previously which was negative.  About

9 days ago patient started developing lower abdominal pain which is exacerbated 

by coughing or movement.  Also is asthmatic palpation.  This is in the lower 

abdomen, suprapubic and left lower quadrant for the most part.  No radiation.  

Alleviated by position at times.  Patient has had no known fever.  Patient 

states she has had problems with chronic abdominal pain for some 10 years but 

this is different.  No dysuria.  No hematuria.  No back pain.





No headache, no fever or chills, no changes in vision or hearing, no sore throat

or difficulty with speech, no neck pain, no chest pain or shortness of breath, 

no abdominal pain, no nausea or vomiting, no changes in urination or bowel 

movements, no numbness or tingling, no extremity pain, no skin rashes or 

lesions.


MD Complaint: abdominal pain





- Related Data


                                Home Medications











 Medication  Instructions  Recorded  Confirmed


 


Atorvastatin [Lipitor] 10 mg PO DAILY 11/13/20 07/15/22











                                    Allergies











Allergy/AdvReac Type Severity Reaction Status Date / Time


 


amoxicillin Allergy  Rash/Hives Verified 07/15/22 16:49


 


Penicillins Allergy  Rash/Hives Verified 07/15/22 16:50














Review of Systems


ROS Statement: 


Those systems with pertinent positive or pertinent negative responses have been 

documented in the HPI.





ROS Other: All systems not noted in ROS Statement are negative.





Past Medical History


Past Medical History: Osteoarthritis (OA)


History of Any Multi-Drug Resistant Organisms: None Reported


Past Surgical History: Hysterectomy, Joint Replacement


Additional Past Surgical History / Comment(s): rt foot bunionectomy


Past Anesthesia/Blood Transfusion Reactions: Previous Problems w/ Anesthesia, 

Motion Sickness


Additional Past Anesthesia/Blood Transfusion Reaction / Comment(s): hard time 

waking up


Past Psychological History: No Psychological Hx Reported


Smoking Status: Never smoker


Past Alcohol Use History: None Reported


Past Drug Use History: None Reported





- Past Family History


  ** Father


Family Medical History: Deep Vein Thrombosis (DVT)





  ** Son(s)


Family Medical History: Cancer





General Exam


Limitations: no limitations


General appearance: alert, in no apparent distress


Head exam: Present: atraumatic, normocephalic, normal inspection


Eye exam: Present: normal appearance, PERRL, EOMI.  Absent: scleral icterus, 

conjunctival injection, periorbital swelling


ENT exam: Present: normal exam, mucous membranes moist


Neck exam: Present: normal inspection, full ROM.  Absent: tenderness, 

meningismus, lymphadenopathy


Respiratory exam: Present: normal lung sounds bilaterally, other (Dry cough 

noted throughout the course of the examination).  Absent: respiratory distress, 

wheezes, rales, rhonchi, stridor


Cardiovascular Exam: Present: regular rate, normal rhythm, normal heart sounds. 

 Absent: systolic murmur, diastolic murmur, rubs, gallop, clicks


GI/Abdominal exam: Present: soft, tenderness, guarding (Patient has tenderness 

in the suprapubic area and left lower quadrant area to palpation.), normal bowel

 sounds.  Absent: distended, rebound, rigid


Extremities exam: Present: normal inspection, full ROM, normal capillary refill.

  Absent: tenderness, pedal edema, joint swelling, calf tenderness


Back exam: Present: normal inspection


Neurological exam: Present: alert, oriented X3, CN II-XII intact


Psychiatric exam: Present: normal affect, normal mood


Skin exam: Present: warm, dry, intact, normal color.  Absent: rash





Course


                                   Vital Signs











  07/15/22 07/15/22 07/15/22





  13:38 16:31 18:26


 


Temperature 98.3 F  


 


Pulse Rate 98 66 99


 


Respiratory 20 18 18





Rate   


 


Blood Pressure 121/85  119/70


 


O2 Sat by Pulse 97  95





Oximetry   














- Reevaluation(s)


Reevaluation #1: 





07/15/22 17:40


Medical record is reviewed


Computed tomography scan ordered.  Patient has elevated white count with air-

fluid levels on plain film x-ray.  We'll assess for intra-abdominal pathology.


Patient is informed of results and questions answered


Patient in no distress


Reevaluation #2: 





07/15/22 18:27


Computed tomography scan abdomen and pelvis reveals sigmoid diverticulitis with 

probable abscess.  There is a 1.2 cm small intramural abscess patient's the 

sigmoid colon.  Patient also has a 4.2 cm cystic area to the left ovary.





She hemodynamically stable.  Patient started on levofloxacin and metronidazole. 

 Blood cultures ordered, patient will be admitted





- Consultations


Consultation #1: 





Case discussed with Dr. Curiel due to the patient's sigmoid diverticulitis 

with abscess formation.  She requests admission to medicine and she will consult

 on the case.





Medical Decision Making





- Medical Decision Making





Differential diagnosis includes bronchitis, possible atypical pneumonia, less 

likely bacterial pneumonia as patient has no fever.  As far as the lower 

abdominal pain is concerned, diverticulitis is within the differential.  Less 

likely perforation.  Does not appear to be consistent with urogenital disease.  

Presentation consistent with cardiac disease.  Not consistent with vascular di

sease.  There is no pulsatile mass.  Symptoms been going on for quite some time.

  We'll order a general respiratory/abdominal workup.  Water COVID-19 testing 

with influenza testing.  Plain film x-rays initially.





The case was discussed in detail with ED attending physician.  Presentation, 

findings, treatment plan discussed in detail.





Discussed with on-call surgery, patient admitted to medicine--Dr. Orta from 

[facility]





All findings discussed with the patient.





Levaquin, metronidazole





- Lab Data


Result diagrams: 


                                 07/15/22 16:35





                                 07/15/22 16:35


                                   Lab Results











  07/15/22 07/15/22 07/15/22 Range/Units





  16:35 16:35 16:35 


 


WBC  13.1 H    (3.8-10.6)  k/uL


 


RBC  4.65    (3.80-5.40)  m/uL


 


Hgb  13.6    (11.4-16.0)  gm/dL


 


Hct  41.4    (34.0-46.0)  %


 


MCV  89.0    (80.0-100.0)  fL


 


MCH  29.2    (25.0-35.0)  pg


 


MCHC  32.8    (31.0-37.0)  g/dL


 


RDW  14.0    (11.5-15.5)  %


 


Plt Count  323    (150-450)  k/uL


 


MPV  7.6    


 


Neutrophils %  78    %


 


Lymphocytes %  12    %


 


Monocytes %  7    %


 


Eosinophils %  1    %


 


Basophils %  0    %


 


Neutrophils #  10.2 H    (1.3-7.7)  k/uL


 


Lymphocytes #  1.6    (1.0-4.8)  k/uL


 


Monocytes #  0.9    (0-1.0)  k/uL


 


Eosinophils #  0.1    (0-0.7)  k/uL


 


Basophils #  0.1    (0-0.2)  k/uL


 


Sodium    135 L  (137-145)  mmol/L


 


Potassium    4.5  (3.5-5.1)  mmol/L


 


Chloride    101  ()  mmol/L


 


Carbon Dioxide    24  (22-30)  mmol/L


 


Anion Gap    10  mmol/L


 


BUN    8  (7-17)  mg/dL


 


Creatinine    0.72  (0.52-1.04)  mg/dL


 


Est GFR (CKD-EPI)AfAm    >90  (>60 ml/min/1.73 sqM)  


 


Est GFR (CKD-EPI)NonAf    88  (>60 ml/min/1.73 sqM)  


 


Glucose    106 H  (74-99)  mg/dL


 


Plasma Lactic Acid Doroteo     (0.7-2.0)  mmol/L


 


Calcium    9.4  (8.4-10.2)  mg/dL


 


Magnesium    2.2  (1.6-2.3)  mg/dL


 


Total Bilirubin    0.7  (0.2-1.3)  mg/dL


 


AST    39 H  (14-36)  U/L


 


ALT    35 H  (4-34)  U/L


 


Alkaline Phosphatase    93  ()  U/L


 


Total Protein    7.0  (6.3-8.2)  g/dL


 


Albumin    3.9  (3.5-5.0)  g/dL


 


Amylase    47  ()  U/L


 


Lipase    93  ()  U/L


 


Urine Color   Light Yellow   


 


Urine Appearance   Clear   (Clear)  


 


Urine pH   6.5   (5.0-8.0)  


 


Ur Specific Gravity   1.044 H   (1.001-1.035)  


 


Urine Protein   Negative   (Negative)  


 


Urine Glucose (UA)   Negative   (Negative)  


 


Urine Ketones   Trace H   (Negative)  


 


Urine Blood   Negative   (Negative)  


 


Urine Nitrite   Negative   (Negative)  


 


Urine Bilirubin   Negative   (Negative)  


 


Urine Urobilinogen   <2.0   (<2.0)  mg/dL


 


Ur Leukocyte Esterase   Negative   (Negative)  


 


Coronavirus (PCR)     (Not Detectd)  


 


Influenza Type A RNA     (Not Detectd)  


 


Influenza Type B (PCR)     (Not Detectd)  














  07/15/22 07/15/22 07/15/22 Range/Units





  16:35 16:35 16:35 


 


WBC     (3.8-10.6)  k/uL


 


RBC     (3.80-5.40)  m/uL


 


Hgb     (11.4-16.0)  gm/dL


 


Hct     (34.0-46.0)  %


 


MCV     (80.0-100.0)  fL


 


MCH     (25.0-35.0)  pg


 


MCHC     (31.0-37.0)  g/dL


 


RDW     (11.5-15.5)  %


 


Plt Count     (150-450)  k/uL


 


MPV     


 


Neutrophils %     %


 


Lymphocytes %     %


 


Monocytes %     %


 


Eosinophils %     %


 


Basophils %     %


 


Neutrophils #     (1.3-7.7)  k/uL


 


Lymphocytes #     (1.0-4.8)  k/uL


 


Monocytes #     (0-1.0)  k/uL


 


Eosinophils #     (0-0.7)  k/uL


 


Basophils #     (0-0.2)  k/uL


 


Sodium     (137-145)  mmol/L


 


Potassium     (3.5-5.1)  mmol/L


 


Chloride     ()  mmol/L


 


Carbon Dioxide     (22-30)  mmol/L


 


Anion Gap     mmol/L


 


BUN     (7-17)  mg/dL


 


Creatinine     (0.52-1.04)  mg/dL


 


Est GFR (CKD-EPI)AfAm     (>60 ml/min/1.73 sqM)  


 


Est GFR (CKD-EPI)NonAf     (>60 ml/min/1.73 sqM)  


 


Glucose     (74-99)  mg/dL


 


Plasma Lactic Acid Doroteo  1.2    (0.7-2.0)  mmol/L


 


Calcium     (8.4-10.2)  mg/dL


 


Magnesium     (1.6-2.3)  mg/dL


 


Total Bilirubin     (0.2-1.3)  mg/dL


 


AST     (14-36)  U/L


 


ALT     (4-34)  U/L


 


Alkaline Phosphatase     ()  U/L


 


Total Protein     (6.3-8.2)  g/dL


 


Albumin     (3.5-5.0)  g/dL


 


Amylase     ()  U/L


 


Lipase     ()  U/L


 


Urine Color     


 


Urine Appearance     (Clear)  


 


Urine pH     (5.0-8.0)  


 


Ur Specific Gravity     (1.001-1.035)  


 


Urine Protein     (Negative)  


 


Urine Glucose (UA)     (Negative)  


 


Urine Ketones     (Negative)  


 


Urine Blood     (Negative)  


 


Urine Nitrite     (Negative)  


 


Urine Bilirubin     (Negative)  


 


Urine Urobilinogen     (<2.0)  mg/dL


 


Ur Leukocyte Esterase     (Negative)  


 


Coronavirus (PCR)    Not Detected  (Not Detectd)  


 


Influenza Type A RNA   Not Detected   (Not Detectd)  


 


Influenza Type B (PCR)   Not Detected   (Not Detectd)  














- Radiology Data


Radiology results: report reviewed, image reviewed





Disposition


Clinical Impression: 


 Abscess of sigmoid colon due to diverticulitis, Left ovarian cyst, Cough





Disposition: ADMITTED AS IP TO THIS [facility]


Condition: Stable


Referrals: 


Petty Bojorquez MD [Primary Care Provider] - 1-2 days


Time of Disposition: 18:28


Decision to Admit Reason: Admit from EC


Decision Time: 18:28

## 2022-07-15 NOTE — CT
EXAMINATION TYPE: CT abdomen pelvis w con

 

DATE OF EXAM: 7/15/2022

 

COMPARISON: 

None

 

HISTORY: Lower abdominal pain

 

CT DLP: 1324.7 mGycm

 

CONTRAST: 

CT scan of the abdomen and pelvis is performed without Oral Contrast and with IV Contrast, patient in
jected with 100 mL of Isovue 300.

 

FINDINGS: 

LUNG BASES-: Calcified granulomas at the lung bases. No infiltrate. 

 

LIVER/GB:   No calcified gallstones.  No space occupying hepatic lesion. Biliary tree is of normal ca
liber. 

 

PANCREAS:  No inflammation.  No distinct mass. 

 

SPLEEN:  No splenic enlargement.  No lesion seen. 

 

ADRENALS:  No nodule.  No thickening. 

 

KIDNEYS/BLADDER:  No hydronephrosis.  No nephrolithiasis.  No distinct renal mass.  Urinary bladder g
rossly unremarkable. 

 

BOWEL: There is wall thickening and inflammatory change involving the sigmoid colon. Small intramural
 abscess is difficult to exclude measuring 1.2 cm axial image 64 of 69.. No extraluminal air or extra
luminal abscess seen. Inflated: Resides directly adjacent to the left ovary which demonstrates a 4.2 
cm cystic lesion.

 

GENITAL ORGANS: 4.2 cm left ovarian cystic lesion resides adjacent to the inflamed sigmoid colon. The
re appears to be evidence of hysterectomy and prior right-sided nephrectomy.

 

LYMPH NODES:  No greater than 1cm abdominal or pelvic lymph nodes are appreciated.

 

AORTA: No significant abnormality. 

 

OSSEOUS STRUCTURES:  No significant abnormality is seen. 

 

OTHER:  No significant additional abnormality is seen. 

 

IMPRESSION: 

1. Sigmoid diverticulitis as discussed above.

## 2022-07-16 LAB
ANION GAP SERPL CALC-SCNC: 10.6 MMOL/L (ref 10–18)
BASOPHILS # BLD AUTO: 0.06 X 10*3/UL (ref 0–0.1)
BASOPHILS NFR BLD AUTO: 0.5 %
BUN SERPL-SCNC: 7 MG/DL (ref 9–27)
BUN/CREAT SERPL: 10 RATIO (ref 12–20)
CALCIUM SPEC-MCNC: 8.9 MG/DL (ref 8.7–10.3)
CHLORIDE SERPL-SCNC: 103 MMOL/L (ref 96–109)
CO2 SERPL-SCNC: 23.4 MMOL/L (ref 20–27.5)
EOSINOPHIL # BLD AUTO: 0.06 X 10*3/UL (ref 0.04–0.35)
EOSINOPHIL NFR BLD AUTO: 0.5 %
ERYTHROCYTE [DISTWIDTH] IN BLOOD BY AUTOMATED COUNT: 4.25 X 10*6/UL (ref 4.1–5.2)
ERYTHROCYTE [DISTWIDTH] IN BLOOD: 14.2 % (ref 11.5–14.5)
GLUCOSE SERPL-MCNC: 102 MG/DL (ref 70–110)
HCT VFR BLD AUTO: 37.4 % (ref 37.2–46.3)
HGB BLD-MCNC: 11.6 G/DL (ref 12–15)
IMM GRANULOCYTES BLD QL AUTO: 0.7 %
LYMPHOCYTES # SPEC AUTO: 1.29 X 10*3/UL (ref 0.9–5)
LYMPHOCYTES NFR SPEC AUTO: 11.4 %
MCH RBC QN AUTO: 27.3 PG (ref 27–32)
MCHC RBC AUTO-ENTMCNC: 31 G/DL (ref 32–37)
MCV RBC AUTO: 88 FL (ref 80–97)
MONOCYTES # BLD AUTO: 1.29 X 10*3/UL (ref 0.2–1)
MONOCYTES NFR BLD AUTO: 11.4 %
NEUTROPHILS # BLD AUTO: 8.57 X 10*3/UL (ref 1.8–7.7)
NEUTROPHILS NFR BLD AUTO: 75.5 %
NRBC BLD AUTO-RTO: 0 /100 WBCS (ref 0–0)
PLATELET # BLD AUTO: 272 X 10*3/UL (ref 140–440)
POTASSIUM SERPL-SCNC: 4.4 MMOL/L (ref 3.5–5.5)
SODIUM SERPL-SCNC: 137 MMOL/L (ref 135–145)
WBC # BLD AUTO: 11.35 X 10*3/UL (ref 4.5–10)

## 2022-07-16 RX ADMIN — CEFAZOLIN SCH MLS/HR: 330 INJECTION, POWDER, FOR SOLUTION INTRAMUSCULAR; INTRAVENOUS at 07:57

## 2022-07-16 RX ADMIN — CEFAZOLIN SCH MLS/HR: 330 INJECTION, POWDER, FOR SOLUTION INTRAMUSCULAR; INTRAVENOUS at 17:42

## 2022-07-16 RX ADMIN — METRONIDAZOLE SCH MLS/HR: 500 INJECTION, SOLUTION INTRAVENOUS at 12:17

## 2022-07-16 RX ADMIN — HEPARIN SODIUM SCH UNIT: 5000 INJECTION INTRAVENOUS; SUBCUTANEOUS at 07:56

## 2022-07-16 RX ADMIN — MORPHINE SULFATE PRN MG: 4 INJECTION, SOLUTION INTRAMUSCULAR; INTRAVENOUS at 05:34

## 2022-07-16 RX ADMIN — MORPHINE SULFATE PRN MG: 4 INJECTION, SOLUTION INTRAMUSCULAR; INTRAVENOUS at 10:29

## 2022-07-16 RX ADMIN — METRONIDAZOLE SCH MLS/HR: 500 INJECTION, SOLUTION INTRAVENOUS at 00:30

## 2022-07-16 RX ADMIN — METRONIDAZOLE SCH MLS/HR: 500 INJECTION, SOLUTION INTRAVENOUS at 17:41

## 2022-07-16 RX ADMIN — CEFAZOLIN SCH MLS/HR: 330 INJECTION, POWDER, FOR SOLUTION INTRAMUSCULAR; INTRAVENOUS at 04:05

## 2022-07-16 RX ADMIN — HEPARIN SODIUM SCH UNIT: 5000 INJECTION INTRAVENOUS; SUBCUTANEOUS at 16:09

## 2022-07-16 RX ADMIN — METRONIDAZOLE SCH MLS/HR: 500 INJECTION, SOLUTION INTRAVENOUS at 05:35

## 2022-07-16 NOTE — P.GSCN
History of Present Illness


Consult date: 07/16/22


History of present illness: 








REASON FOR CONSULTATION: Diverticulitis





HISTORY OF PRESENT ILLNESS: The patient is a 66 year old female who comes in 

with lower abdominal pain from yesterday due to diverticulitis. Patient reports 

recent colonoscopy done by gastroenterologist Dr. Redd from [place].  She 

reports long lasting history of abdominal pain for over 8 years including change

in bowel habits.  She was told to avoid seeds.  Patient reports eating seed mix.

 She reports taking morphine with improvement of lower abdominal pain. She is 

also wanting to go home.  No blood in stools.





PAST MEDICAL HISTORY: 


See list and reviewed





PAST SURGICAL HISTORY: 


See list and reviewed





MEDICATIONS: 


See list and reviewed





ALLERGIES: 


See list and reviewed





SOCIAL HISTORY: See list and reviewed





FAMILY HISTORY:  See list and reviewed





REVIEW OF ORGAN SYSTEMS:


CONSTITUTIONAL:  No fevers or chills. No recent weight loss.


EYES: Has cataracts. 


HEENT:  No difficulties with hearing. No nosebleeds.  No difficulty swallowing. 


RESPIRATORY:  Denies pneumonia. Denies any troubles with breathing or dyspnea on

exertion. 


CARDIOVASCULAR:  Denies any chest pain, palpitations, or recent heart attacks. 

Has hyperlipdemia. 


GASTROINTESTINAL: Denies fatty food intolerance.  Has change in bowel habits and

gas bloat.  


GENITOURINARY:  Denies any blood in urine or increased urinary frequency.  


NEUROLOGICAL:  Denies any numbness or tingling along the distal extremities. No 

seizure disorders or headaches.


MUSCULOSKELETAL:  Has back pain, stiffness or joint arthritis. 


SKIN: No current skin cancer. No rash.


PSYCHIATRIC:  Denies current depression or suicidal thoughts.


ENDOCRINE:  Denies current thyroid disorders. Denies any blood sugar glucose 

intolerance.


HEME/LYMPHATIC: Denies any lumps and bumps around the neck. No recent deep 

venous thrombosis.


ALLERGY/IMMUNOLOGY:  No immunoglobulin therapy. No immune deficiencies.


BREAST: Denies current breast lumps, pain or nipple discharge.





PHYSICAL EXAM:


VITALS: Reviewed


CONSTITUTIONAL:  Well developed and in no acute distress. 


EYES:  Conjuctivae without sclera icterus.  Extraocular movements grossly 

intact. 


HEAD, EARS, NOSE, THROAT: Moist buccal mucosa. Head is atraumatic, 

normocephalic. Hears conversational speech. No nasal drainage. 


NECK:  Supple. No JV distention. No thyroidomegaly.


RESPIRATORY:  Non-labored respirations and equal bilateral excursions. No gross 

wheezes. 


CARDIOVASCULAR:   Palpable 2+ radial pulses.


ABDOMEN:  No peritonitis. Lower abdominal pain. 


LYMPH: No neck lymphadenopathy. 


MUSCULOSKELETAL:  NO clubbing, cyanosis or edema.


SKIN:  Warm and well perfused with good skin turgor.


NEUROLOGIC: Cranial nerves II through XII grossly intact.  No focal or 

lateralizing signs. 


PSYCH:  Appropriate affect.  Alert and oriented to person, place and time. 

Displays appropriate insight.





CLINCAL LABS: Reviewed. WBC elevated 13.1 on admission. Hgb normal 13.6 on 

admission. AST and ALT is elevated. 





IMAGING: Independently reviewed.  CT of the abdomen and pelvis independently 

reviewed with findings of abscess of the mucosal wall. Without free perforation.

 Localized abscess identified.








RADIOLOGY: Report reviewed of the CT of the abdomen and pelvis demonstrates left

ovarian cyst with intraluminal abscess 1.2 cm. 





ASSESSMENT: 


1. Sigmoid diverticulitis





PLAN: 


1.  IV fluid hydration


2.  Recommend continued antibiotics.  


3.  May start ice chips popsicles versus liquid diet.  


4.  Continue hospitalization at this time.


5.  Repeat CBC and CMP








ADVANCE DIRECTIVE: 








Thank you for this kind consultation.





Past Medical History


Past Medical History: Osteoarthritis (OA)


Additional Past Medical History / Comment(s): Cataracts. Patient states she has 

planned surgery for cataract removal in the next few weeks.


History of Any Multi-Drug Resistant Organisms: None Reported


Past Surgical History: Hysterectomy, Joint Replacement


Additional Past Surgical History / Comment(s): rt foot bunionectomy


Past Anesthesia/Blood Transfusion Reactions: Previous Problems w/ Anesthesia, 

Motion Sickness


Additional Past Anesthesia/Blood Transfusion Reaction / Comm: hard time waking 

up


Past Psychological History: No Psychological Hx Reported


Smoking Status: Never smoker


Past Alcohol Use History: None Reported


Past Drug Use History: None Reported





- Past Family History


  ** Father


Family Medical History: Deep Vein Thrombosis (DVT)





  ** Son(s)


Family Medical History: Cancer





Medications and Allergies


                                Home Medications











 Medication  Instructions  Recorded  Confirmed  Type


 


Atorvastatin [Lipitor] 10 mg PO DAILY 11/13/20 07/15/22 History








                                    Allergies











Allergy/AdvReac Type Severity Reaction Status Date / Time


 


amoxicillin Allergy  Rash/Hives Verified 07/15/22 16:49


 


Penicillins Allergy  Rash/Hives Verified 07/15/22 16:50














Surgical - Exam


                                   Vital Signs











Temp Pulse Resp BP Pulse Ox


 


 98.3 F   98   20   121/85   97 


 


 07/15/22 13:38  07/15/22 13:38  07/15/22 13:38  07/15/22 13:38  07/15/22 13:38














Results





- Labs





                                 07/16/22 06:33





                                 07/16/22 06:33


                  Abnormal Lab Results - Last 24 Hours (Table)











  07/15/22 07/15/22 07/15/22 Range/Units





  16:35 16:35 16:35 


 


WBC  13.1 H    (3.8-10.6)  k/uL


 


Hgb     (12.0-15.0)  g/dL


 


MCHC     (32.0-37.0)  g/dL


 


Immature Gran #     (0.00-0.04)  X 10*3/uL


 


Neutrophils #  10.2 H    (1.3-7.7)  k/uL


 


Monocytes #     (0.20-1.00)  X 10*3/uL


 


Sodium    135 L  (137-145)  mmol/L


 


BUN     (9.0-27.0)  mg/dL


 


BUN/Creatinine Ratio     (12.00-20.00)  Ratio


 


Glucose    106 H  (74-99)  mg/dL


 


AST    39 H  (14-36)  U/L


 


ALT    35 H  (4-34)  U/L


 


Procalcitonin     (0.02-0.09)  ng/mL


 


Ur Specific Gravity   1.044 H   (1.001-1.035)  


 


Urine Ketones   Trace H   (Negative)  














  07/15/22 07/16/22 07/16/22 Range/Units





  16:35 06:33 06:33 


 


WBC   11.35 H   (3.8-10.6)  k/uL


 


Hgb   11.6 L   (12.0-15.0)  g/dL


 


MCHC   31.0 L   (32.0-37.0)  g/dL


 


Immature Gran #   0.08 H   (0.00-0.04)  X 10*3/uL


 


Neutrophils #   8.57 H   (1.3-7.7)  k/uL


 


Monocytes #   1.29 H   (0.20-1.00)  X 10*3/uL


 


Sodium     (137-145)  mmol/L


 


BUN    7.0 L  (9.0-27.0)  mg/dL


 


BUN/Creatinine Ratio    10.00 L  (12.00-20.00)  Ratio


 


Glucose     (74-99)  mg/dL


 


AST     (14-36)  U/L


 


ALT     (4-34)  U/L


 


Procalcitonin  0.12 H    (0.02-0.09)  ng/mL


 


Ur Specific Gravity     (1.001-1.035)  


 


Urine Ketones     (Negative)  








                                 Diabetes panel











  07/15/22 07/16/22 Range/Units





  16:35 06:33 


 


Sodium  135 L  137  (137-145)  mmol/L


 


Potassium  4.5  4.4  (3.5-5.1)  mmol/L


 


Chloride  101  103  ()  mmol/L


 


Carbon Dioxide  24  23.4  (22-30)  mmol/L


 


BUN  8  7.0 L  (7-17)  mg/dL


 


Creatinine  0.72  0.7  (0.52-1.04)  mg/dL


 


Glucose  106 H  102  (74-99)  mg/dL


 


Calcium  9.4  8.9  (8.4-10.2)  mg/dL


 


AST  39 H   (14-36)  U/L


 


ALT  35 H   (4-34)  U/L


 


Alkaline Phosphatase  93   ()  U/L


 


Total Protein  7.0   (6.3-8.2)  g/dL


 


Albumin  3.9   (3.5-5.0)  g/dL








                                  Calcium panel











  07/15/22 07/16/22 Range/Units





  16:35 06:33 


 


Calcium  9.4  8.9  (8.4-10.2)  mg/dL


 


Albumin  3.9   (3.5-5.0)  g/dL








                                 Pituitary panel











  07/15/22 07/16/22 Range/Units





  16:35 06:33 


 


Sodium  135 L  137  (137-145)  mmol/L


 


Potassium  4.5  4.4  (3.5-5.1)  mmol/L


 


Chloride  101  103  ()  mmol/L


 


Carbon Dioxide  24  23.4  (22-30)  mmol/L


 


BUN  8  7.0 L  (7-17)  mg/dL


 


Creatinine  0.72  0.7  (0.52-1.04)  mg/dL


 


Glucose  106 H  102  (74-99)  mg/dL


 


Calcium  9.4  8.9  (8.4-10.2)  mg/dL








                                  Adrenal panel











  07/15/22 07/16/22 Range/Units





  16:35 06:33 


 


Sodium  135 L  137  (137-145)  mmol/L


 


Potassium  4.5  4.4  (3.5-5.1)  mmol/L


 


Chloride  101  103  ()  mmol/L


 


Carbon Dioxide  24  23.4  (22-30)  mmol/L


 


BUN  8  7.0 L  (7-17)  mg/dL


 


Creatinine  0.72  0.7  (0.52-1.04)  mg/dL


 


Glucose  106 H  102  (74-99)  mg/dL


 


Calcium  9.4  8.9  (8.4-10.2)  mg/dL


 


Total Bilirubin  0.7   (0.2-1.3)  mg/dL


 


AST  39 H   (14-36)  U/L


 


ALT  35 H   (4-34)  U/L


 


Alkaline Phosphatase  93   ()  U/L


 


Total Protein  7.0   (6.3-8.2)  g/dL


 


Albumin  3.9   (3.5-5.0)  g/dL

## 2022-07-16 NOTE — P.HPIM
History of Present Illness


H&P Date: 07/15/22





The patient is a 66-year-old female with a PMH of hyperlipidemia who presents to

the medicine should with complaints of abdominal pain.  The patient reports that

her symptoms started initially 9 days ago with diffuse lower abdominal pain.  

She reports that the pain gradually worsened, at maximal intensity was 9 out of 

10, diffusing throughout the lower abdomen, particularly in the left lower quadr

ant and suprapubic regions, worsened with coughing, and alleviated with certain 

positions.  Denied experiencing nausea, vomiting, or diarrhea.  Denied urinary 

complaints, fever, or chills.  The patient does report a long-standing history 

of chronic abdominal pain with self diagnosis of  IBS-C. the patient reports 

that she developed a nonproductive cough 2-3 weeks ago which she attributes to 

seasonal ALLERGIES.  Denies chest discomfort or shortness of breath.  At time of

interview, reports that her lower abdominal pain is improved to 4 out of 10 

after receiving any pain medications.  CT abdomen and pelvis revealed findings 

consistent with diverticulitis with abscess.  Laboratory evaluation was 

remarkable for leukocytosis of 13.1, lactic acid 1.2, AST 39, ALT 35. 





Review of systems:


Pertinent positives and negatives as discussed in HPI, a complete review of 

systems was performed and all other systems are negative.





Physical examination:


General: non toxic, no distress, appears at stated age, normal weight


Derm: no unusual rashes/lesions, warm


Head: atraumatic, normocephalic, symmetric


Eyes: EOMI, no lid lag, anicteric sclera, pupils equal round reactive to light


ENT: Nose and ears atraumatic


Neck: No cervical lymphadenopathy, trachea midline, supple


Mouth: no lip lesion, mucus membranes moist


Cardiovascular: S1S2 reg, no murmur, positive dorsalis pedis pulse bilateral, no

edema


Lungs: CTA bilateral, no rhonchi, no rales, no accessory muscle use


Abdominal: soft, left lower quadrant suprapubic tenderness to palpation with 

minimal guarding


Ext: muscle strength 5 out of 5 in all 4 extremities grossly, no gross muscle 

atrophy, no contractures, 


Neuro:  CN II-XI grossly intact, no gross focal neuro deficits


Psych: Alert, oriented, appropriate affect 





Assessment/plan





Diverticulitis with abscess


-Continue with IV antibiotics: Flagyl and Levaquin


-IV fluids


-Antiemetics


-Pain control





DVT prophylaxis


-Heparin subcu





The patient is admitted with an anticipated greater than 2 midnight stay for 

evaluation of diverticulitis.


CODE STATUS: Full Code


Discussed with: Patient


Anticipated discharge date: 2-3 days


Anticipated discharge place: Home











Past Medical History


Past Medical History: Osteoarthritis (OA)


History of Any Multi-Drug Resistant Organisms: None Reported


Past Surgical History: Hysterectomy, Joint Replacement


Additional Past Surgical History / Comment(s): rt foot bunionectomy


Past Anesthesia/Blood Transfusion Reactions: Previous Problems w/ Anesthesia, 

Motion Sickness


Additional Past Anesthesia/Blood Transfusion Reaction / Comment(s): hard time 

waking up


Past Psychological History: No Psychological Hx Reported


Smoking Status: Never smoker


Past Alcohol Use History: None Reported


Past Drug Use History: None Reported





- Past Family History


  ** Father


Family Medical History: Deep Vein Thrombosis (DVT)





  ** Son(s)


Family Medical History: Cancer





Medications and Allergies


                                Home Medications











 Medication  Instructions  Recorded  Confirmed  Type


 


Atorvastatin [Lipitor] 10 mg PO DAILY 11/13/20 07/15/22 History








                                    Allergies











Allergy/AdvReac Type Severity Reaction Status Date / Time


 


amoxicillin Allergy  Rash/Hives Verified 07/15/22 16:49


 


Penicillins Allergy  Rash/Hives Verified 07/15/22 16:50














Physical Exam


Vitals: 


                                   Vital Signs











  Temp Pulse Resp BP Pulse Ox


 


 07/15/22 20:14  99.2 F  69  16  106/69  95


 


 07/15/22 18:26   99  18  119/70  95


 


 07/15/22 16:31   66  18  


 


 07/15/22 13:38  98.3 F  98  20  121/85  97








                                Intake and Output











 07/15/22 07/15/22 07/15/22





 06:59 14:59 22:59


 


Other:   


 


  Weight  85.275 kg 














Results


CBC & Chem 7: 


                                 07/15/22 16:35





                                 07/15/22 16:35


Labs: 


                  Abnormal Lab Results - Last 24 Hours (Table)











  07/15/22 07/15/22 07/15/22 Range/Units





  16:35 16:35 16:35 


 


WBC  13.1 H    (3.8-10.6)  k/uL


 


Neutrophils #  10.2 H    (1.3-7.7)  k/uL


 


Sodium    135 L  (137-145)  mmol/L


 


Glucose    106 H  (74-99)  mg/dL


 


AST    39 H  (14-36)  U/L


 


ALT    35 H  (4-34)  U/L


 


Ur Specific Gravity   1.044 H   (1.001-1.035)  


 


Urine Ketones   Trace H   (Negative)

## 2022-07-17 LAB
ALBUMIN SERPL-MCNC: 3 G/DL (ref 3.8–4.9)
ALBUMIN/GLOB SERPL: 1.25 G/DL (ref 1.6–3.17)
ALP SERPL-CCNC: 67 U/L (ref 41–126)
ALT SERPL-CCNC: 22 U/L (ref 8–44)
ANION GAP SERPL CALC-SCNC: 9.7 MMOL/L (ref 10–18)
AST SERPL-CCNC: 18 U/L (ref 13–35)
BASOPHILS # BLD AUTO: 0.05 X 10*3/UL (ref 0–0.1)
BASOPHILS NFR BLD AUTO: 0.4 %
BUN SERPL-SCNC: 6.4 MG/DL (ref 9–27)
BUN/CREAT SERPL: 10.67 RATIO (ref 12–20)
CALCIUM SPEC-MCNC: 8.7 MG/DL (ref 8.7–10.3)
CHLORIDE SERPL-SCNC: 104 MMOL/L (ref 96–109)
CO2 SERPL-SCNC: 25.3 MMOL/L (ref 20–27.5)
EOSINOPHIL # BLD AUTO: 0.05 X 10*3/UL (ref 0.04–0.35)
EOSINOPHIL NFR BLD AUTO: 0.4 %
ERYTHROCYTE [DISTWIDTH] IN BLOOD BY AUTOMATED COUNT: 4.03 X 10*6/UL (ref 4.1–5.2)
ERYTHROCYTE [DISTWIDTH] IN BLOOD: 13.8 % (ref 11.5–14.5)
GLOBULIN SER CALC-MCNC: 2.4 G/DL (ref 1.6–3.3)
GLUCOSE SERPL-MCNC: 92 MG/DL (ref 70–110)
HCT VFR BLD AUTO: 35.6 % (ref 37.2–46.3)
HGB BLD-MCNC: 11.2 G/DL (ref 12–15)
IMM GRANULOCYTES BLD QL AUTO: 1 %
LYMPHOCYTES # SPEC AUTO: 2.02 X 10*3/UL (ref 0.9–5)
LYMPHOCYTES NFR SPEC AUTO: 17.8 %
MCH RBC QN AUTO: 27.8 PG (ref 27–32)
MCHC RBC AUTO-ENTMCNC: 31.5 G/DL (ref 32–37)
MCV RBC AUTO: 88.3 FL (ref 80–97)
MONOCYTES # BLD AUTO: 0.94 X 10*3/UL (ref 0.2–1)
MONOCYTES NFR BLD AUTO: 8.3 %
NEUTROPHILS # BLD AUTO: 8.15 X 10*3/UL (ref 1.8–7.7)
NEUTROPHILS NFR BLD AUTO: 72.1 %
NRBC BLD AUTO-RTO: 0 /100 WBCS (ref 0–0)
PLATELET # BLD AUTO: 272 X 10*3/UL (ref 140–440)
POTASSIUM SERPL-SCNC: 4.6 MMOL/L (ref 3.5–5.5)
PROT SERPL-MCNC: 5.4 G/DL (ref 6.2–8.2)
SODIUM SERPL-SCNC: 139 MMOL/L (ref 135–145)
WBC # BLD AUTO: 11.32 X 10*3/UL (ref 4.5–10)

## 2022-07-17 RX ADMIN — ACETAMINOPHEN SCH MLS/HR: 10 INJECTION, SOLUTION INTRAVENOUS at 23:36

## 2022-07-17 RX ADMIN — KETOROLAC TROMETHAMINE SCH MG: 15 INJECTION, SOLUTION INTRAMUSCULAR; INTRAVENOUS at 17:25

## 2022-07-17 RX ADMIN — HEPARIN SODIUM SCH UNIT: 5000 INJECTION INTRAVENOUS; SUBCUTANEOUS at 12:36

## 2022-07-17 RX ADMIN — HEPARIN SODIUM SCH UNIT: 5000 INJECTION INTRAVENOUS; SUBCUTANEOUS at 15:47

## 2022-07-17 RX ADMIN — METRONIDAZOLE SCH MLS/HR: 500 INJECTION, SOLUTION INTRAVENOUS at 00:02

## 2022-07-17 RX ADMIN — MORPHINE SULFATE PRN MG: 4 INJECTION, SOLUTION INTRAMUSCULAR; INTRAVENOUS at 03:20

## 2022-07-17 RX ADMIN — HEPARIN SODIUM SCH UNIT: 5000 INJECTION INTRAVENOUS; SUBCUTANEOUS at 00:02

## 2022-07-17 RX ADMIN — METRONIDAZOLE SCH MLS/HR: 500 INJECTION, SOLUTION INTRAVENOUS at 12:36

## 2022-07-17 RX ADMIN — CEFAZOLIN SCH MLS/HR: 330 INJECTION, POWDER, FOR SOLUTION INTRAMUSCULAR; INTRAVENOUS at 12:37

## 2022-07-17 RX ADMIN — CEFAZOLIN SCH: 330 INJECTION, POWDER, FOR SOLUTION INTRAMUSCULAR; INTRAVENOUS at 17:02

## 2022-07-17 RX ADMIN — MORPHINE SULFATE PRN MG: 4 INJECTION, SOLUTION INTRAMUSCULAR; INTRAVENOUS at 12:34

## 2022-07-17 RX ADMIN — METRONIDAZOLE SCH MLS/HR: 500 INJECTION, SOLUTION INTRAVENOUS at 06:01

## 2022-07-17 RX ADMIN — METRONIDAZOLE SCH MLS/HR: 500 INJECTION, SOLUTION INTRAVENOUS at 17:25

## 2022-07-17 RX ADMIN — CEFAZOLIN SCH MLS/HR: 330 INJECTION, POWDER, FOR SOLUTION INTRAMUSCULAR; INTRAVENOUS at 00:03

## 2022-07-17 RX ADMIN — ACETAMINOPHEN SCH MLS/HR: 10 INJECTION, SOLUTION INTRAVENOUS at 17:25

## 2022-07-17 NOTE — P.PN
Subjective


Progress Note Date: 07/17/22








CHIEF COMPLAINT: Diverticulitis





HISTORY OF PRESENT ILLNESS: The patient is a 66 year old female who comes in 

diverticulitis. Patient still reports lower abdominal pain.  WBC is still 

elevated despite Levaquin and Flagyl. 





REVIEW OF ORGAN SYSTEMS: No fevers or chills. Denies pneumonia. Denies fatty 

food intolerance.  





PHYSICAL EXAM:


VITALS: Reviewed


CONSTITUTIONAL:  Well developed and in no acute distress. 


EYES:  Conjuctivae without sclera icterus.  Extraocular movements grossly 

intact. 


HEAD, EARS, NOSE, THROAT: Moist buccal mucosa. Head is atraumatic, 

normocephalic. Hears conversational speech. No nasal drainage. 


RESPIRATORY:  Non-labored respirations and equal bilateral excursions. No gross 

wheezes. 


CARDIOVASCULAR:   Palpable 2+ radial pulses.


ABDOMEN:  No peritonitis. Lower abdominal pain. 


MUSCULOSKELETAL:  NO clubbing, cyanosis or edema.


SKIN:  Warm and well perfused with good skin turgor.


NEUROLOGIC: Cranial nerves II through XII grossly intact.  No focal or 

lateralizing signs. 


PSYCH:  Appropriate affect.  Alert and oriented to person, place and time. 

Displays appropriate insight.





CLINCAL LABS: Reviewed. WBC elevated 11.6 to 11.2. WBC elevated 11.35 to 11.32





ASSESSMENT: 


1. Sigmoid diverticulitis





PLAN: 


1.  Recommend infectious disease consultation for complicated diverticulitis.  


2.  May have clear liquid diet.  


3.  Adjust pain medications to scheduled nonnarcotic.  


4.  Continue hospitalization due to complicated diverticulitis














Objective





- Vital Signs


Vital signs: 


                                   Vital Signs











Temp  98.7 F   07/17/22 08:00


 


Pulse  83   07/17/22 08:00


 


Resp  16   07/17/22 08:00


 


BP  135/80   07/17/22 08:00


 


Pulse Ox  98   07/17/22 08:00


 


FiO2      








                                 Intake & Output











 07/16/22 07/17/22 07/17/22





 18:59 06:59 18:59


 


Intake Total 480  


 


Balance 480  


 


Weight 85.275 kg  


 


Intake:   


 


  Oral 480  


 


Other:   


 


  # Voids 2 2 














- Labs


CBC & Chem 7: 


                                 07/17/22 05:33





                                 07/17/22 05:33


Labs: 


                  Abnormal Lab Results - Last 24 Hours (Table)











  07/17/22 07/17/22 Range/Units





  05:33 05:33 


 


WBC  11.32 H   (4.50-10.00)  X 10*3/uL


 


RBC  4.03 L   (4.10-5.20)  X 10*6/uL


 


Hgb  11.2 L   (12.0-15.0)  g/dL


 


Hct  35.6 L   (37.2-46.3)  %


 


MCHC  31.5 L   (32.0-37.0)  g/dL


 


Immature Gran #  0.11 H   (0.00-0.04)  X 10*3/uL


 


Neutrophils #  8.15 H   (1.80-7.70)  X 10*3/uL


 


Anion Gap   9.70 L  (10.00-18.00)  mmol/L


 


BUN   6.4 L  (9.0-27.0)  mg/dL


 


BUN/Creatinine Ratio   10.67 L  (12.00-20.00)  Ratio


 


Total Protein   5.4 L  (6.2-8.2)  g/dL


 


Albumin   3.0 L  (3.8-4.9)  g/dL


 


Albumin/Globulin Ratio   1.25 L  (1.60-3.17)  g/dL








                      Microbiology - Last 24 Hours (Table)











 07/15/22 19:45 Blood Culture - Preliminary





 Blood    No Growth after 24 hours


 


 07/15/22 20:00 Blood Culture - Preliminary





 Blood    No Growth after 24 hours

## 2022-07-17 NOTE — P.CONS
History of Present Illness





- Reason for Consult


Consult date: 07/17/22





- History of Present Illness


Patient is a 66-year  female with a past medical history significant 

for osteoarthritis chronic bowel issues with constipation apparently recently 

did have a colonoscopy and right diagnosed with diverticulosis presenting to the

hospital with left-sided abdominal pain which apparently started 2 days before 

presentation to the hospital patient described the pain to be more of a sharp in

nature intensity is almost 10 out of 10 by the time he presented to hospital 

with associated nausea but no vomiting and no significant radiation of the pain 

patient denies high-grade fever on presentation to the hospital the patient was 

afebrile and no significant fever have been recorded subsequently patient did 

have white count of 13,000 with a left shift creatinine has been normal there 

was a slightly elevated repeat was normal urine has been negative, leukocytes 

and influenza was negative patient did have a CT of abdominal pelvis which did 

show a wall thickening inflammatory changes involving the sigmoid colon small 

intramural abscess difficult to exclude patient was started on Levaquin and 

Flagyl because of penicillin allergy infectious disease was consulted for 

further management of antibiotic therapy








Past Medical History


Past Medical History: Osteoarthritis (OA)


Additional Past Medical History / Comment(s): Cataracts. Patient states she has 

planned surgery for cataract removal in the next few weeks.


History of Any Multi-Drug Resistant Organisms: None Reported


Past Surgical History: Hysterectomy, Joint Replacement


Additional Past Surgical History / Comment(s): rt foot bunionectomy


Past Anesthesia/Blood Transfusion Reactions: Previous Problems w/ Anesthesia, 

Motion Sickness


Additional Past Anesthesia/Blood Transfusion Reaction / Comm: hard time waking 

up


Past Psychological History: No Psychological Hx Reported


Smoking Status: Never smoker


Past Alcohol Use History: None Reported


Past Drug Use History: None Reported





- Past Family History


  ** Father


Family Medical History: Deep Vein Thrombosis (DVT)





  ** Son(s)


Family Medical History: Cancer





Medications and Allergies


                                Home Medications











 Medication  Instructions  Recorded  Confirmed  Type


 


Atorvastatin [Lipitor] 10 mg PO DAILY 11/13/20 07/15/22 History








                                    Allergies











Allergy/AdvReac Type Severity Reaction Status Date / Time


 


amoxicillin Allergy  Rash/Hives Verified 07/15/22 16:49


 


Penicillins Allergy  Rash/Hives Verified 07/15/22 16:50














Physical Exam


Vitals: 


                                   Vital Signs











  Temp Pulse Resp BP Pulse Ox


 


 07/17/22 08:00  98.7 F  83  16  135/80  98


 


 07/17/22 07:47      95


 


 07/17/22 02:00  98.7 F  68  12  126/71  97








                                Intake and Output











 07/16/22 07/17/22 07/17/22





 22:59 06:59 14:59


 


Intake Total 480  


 


Balance 480  


 


Intake:   


 


  Oral 480  


 


Other:   


 


  # Voids 2 2 














Results


CBC & Chem 7: 


                                 07/17/22 05:33





                                 07/17/22 05:33


Labs: 


                  Abnormal Lab Results - Last 24 Hours (Table)











  07/17/22 07/17/22 Range/Units





  05:33 05:33 


 


WBC  11.32 H   (4.50-10.00)  X 10*3/uL


 


RBC  4.03 L   (4.10-5.20)  X 10*6/uL


 


Hgb  11.2 L   (12.0-15.0)  g/dL


 


Hct  35.6 L   (37.2-46.3)  %


 


MCHC  31.5 L   (32.0-37.0)  g/dL


 


Immature Gran #  0.11 H   (0.00-0.04)  X 10*3/uL


 


Neutrophils #  8.15 H   (1.80-7.70)  X 10*3/uL


 


Anion Gap   9.70 L  (10.00-18.00)  mmol/L


 


BUN   6.4 L  (9.0-27.0)  mg/dL


 


BUN/Creatinine Ratio   10.67 L  (12.00-20.00)  Ratio


 


Total Protein   5.4 L  (6.2-8.2)  g/dL


 


Albumin   3.0 L  (3.8-4.9)  g/dL


 


Albumin/Globulin Ratio   1.25 L  (1.60-3.17)  g/dL








                      Microbiology - Last 24 Hours (Table)











 07/15/22 19:45 Blood Culture - Preliminary





 Blood    No Growth after 24 hours


 


 07/15/22 20:00 Blood Culture - Preliminary





 Blood    No Growth after 24 hours














Assessment and Plan


Plan: 





1patient presented hospital with abdominal pain nausea vomiting did have 

elevated white count has been diagnosed with acute sigmoid diverticulitis with 

no evidence of any perforation or extramural abscess there was a question of 

possible intramural abscess and will need to call for the enteric gram-negative 

both aerobes and anaerobes.


2patient to have a penicillin allergy however did mention she has taken 

Augmentin without any problem and used to get before Before Her Dental Procedure

and Seem to Have Been Doing Okay with That Clinical Note to Penicillin Allergy.


3Discontinue Levaquin.


4Continue the Flagyl and Will Add Rocephin 2 G Daily.


We will follow on clinical condition and cultures to further adjust medication 

if needed


Thank you for this consultation will follow this patient along with you


Time with Patient: Greater than 30

## 2022-07-17 NOTE — P.PN
Subjective


Progress Note Date: 07/17/22





Hospital Course


66-year-old female admitted yesterday with acute diverticulitis.  She was 

started on IV Flagyl and levofloxacin, IV fluids, and IV narcotics.  Her 

abdominal pain has significantly improved today.  She denies any nausea or 

vomiting, she has had no bowel movements in the last 24 hours.  She has been 

afebrile, vital signs of stable. overall improving and unlikely to need surgery





Subjetive 


Patient seen at [facility] 





Physical examination


General: non toxic, no distress, appears at stated age, normal weight


Derm: no unusual rashes/lesions, warm


Head: atraumatic, normocephalic


Eyes: EOMI, anicteric sclera, pupils equal round reactive to light


Neck: No cervical lymphadenopathy, trachea midline, supple


Mouth: no lip lesion, mucus membranes moist


Cardiovascular: S1S2 reg, no murmur, positive dorsalis pedis pulse bilateral, no

edema


Lungs: CTA bilateral, no rhonchi, no rales, no accessory muscle use


Abdominal: soft, left lower quadrant suprapubic tenderness to palpation with no 

guarding, hypoactive bowel sounds


Ext: muscle strength 5 out of 5 in all 4 extremities grossly, no gross muscle 

atrophy, no contractures, 


Neuro:  CN II-XI grossly intact, no gross focal 








Assessment and plan


# Sigmoid Diverticulitis with abscess


-improving


-Continue with IV antibiotics: IV ceftriaxone and Flagyl


-IV fluids


-Antiemetics


-advance diet as tolerated


-General surgery infection disease following





# HLD


-continue Statin





DVT prophylaxis


-Heparin subcu





Discharge plan: Advance diet as able next 1-2 days plan to discharge on oral 

antibiotic with a plan to follow with general surgery or gastroenterology as an 

outpatient for colonoscopy in 6-8 weeks





Objective





- Vital Signs


Vital signs: 


                                   Vital Signs











Temp  98.7 F   07/17/22 08:00


 


Pulse  83   07/17/22 08:00


 


Resp  16   07/17/22 08:00


 


BP  135/80   07/17/22 08:00


 


Pulse Ox  98   07/17/22 08:00


 


FiO2      








                                 Intake & Output











 07/16/22 07/17/22 07/17/22





 18:59 06:59 18:59


 


Intake Total 480  


 


Balance 480  


 


Weight 85.275 kg  


 


Intake:   


 


  Oral 480  


 


Other:   


 


  # Voids 2 2 














- Labs


CBC & Chem 7: 


                                 07/17/22 05:33





                                 07/17/22 05:33


Labs: 


                  Abnormal Lab Results - Last 24 Hours (Table)











  07/17/22 07/17/22 Range/Units





  05:33 05:33 


 


WBC  11.32 H   (4.50-10.00)  X 10*3/uL


 


RBC  4.03 L   (4.10-5.20)  X 10*6/uL


 


Hgb  11.2 L   (12.0-15.0)  g/dL


 


Hct  35.6 L   (37.2-46.3)  %


 


MCHC  31.5 L   (32.0-37.0)  g/dL


 


Immature Gran #  0.11 H   (0.00-0.04)  X 10*3/uL


 


Neutrophils #  8.15 H   (1.80-7.70)  X 10*3/uL


 


Anion Gap   9.70 L  (10.00-18.00)  mmol/L


 


BUN   6.4 L  (9.0-27.0)  mg/dL


 


BUN/Creatinine Ratio   10.67 L  (12.00-20.00)  Ratio


 


Total Protein   5.4 L  (6.2-8.2)  g/dL


 


Albumin   3.0 L  (3.8-4.9)  g/dL


 


Albumin/Globulin Ratio   1.25 L  (1.60-3.17)  g/dL








                      Microbiology - Last 24 Hours (Table)











 07/15/22 19:45 Blood Culture - Preliminary





 Blood    No Growth after 24 hours


 


 07/15/22 20:00 Blood Culture - Preliminary





 Blood    No Growth after 24 hours

## 2022-07-18 LAB
ALBUMIN SERPL-MCNC: 3 G/DL (ref 3.8–4.9)
ALBUMIN/GLOB SERPL: 1.25 G/DL (ref 1.6–3.17)
ALP SERPL-CCNC: 66 U/L (ref 41–126)
ALT SERPL-CCNC: 17 U/L (ref 8–44)
ANION GAP SERPL CALC-SCNC: 10.1 MMOL/L (ref 10–18)
AST SERPL-CCNC: 14 U/L (ref 13–35)
BASOPHILS # BLD AUTO: 0.06 X 10*3/UL (ref 0–0.1)
BASOPHILS NFR BLD AUTO: 0.6 %
BUN SERPL-SCNC: 3.9 MG/DL (ref 9–27)
BUN/CREAT SERPL: 6.11 RATIO (ref 12–20)
CALCIUM SPEC-MCNC: 8.8 MG/DL (ref 8.7–10.3)
CHLORIDE SERPL-SCNC: 105 MMOL/L (ref 96–109)
CO2 SERPL-SCNC: 24.8 MMOL/L (ref 20–27.5)
EOSINOPHIL # BLD AUTO: 0.09 X 10*3/UL (ref 0.04–0.35)
EOSINOPHIL NFR BLD AUTO: 1 %
ERYTHROCYTE [DISTWIDTH] IN BLOOD BY AUTOMATED COUNT: 4.16 X 10*6/UL (ref 4.1–5.2)
ERYTHROCYTE [DISTWIDTH] IN BLOOD: 14 % (ref 11.5–14.5)
GLOBULIN SER CALC-MCNC: 2.4 G/DL (ref 1.6–3.3)
GLUCOSE SERPL-MCNC: 99 MG/DL (ref 70–110)
HCT VFR BLD AUTO: 36.3 % (ref 37.2–46.3)
HGB BLD-MCNC: 11.4 G/DL (ref 12–15)
IMM GRANULOCYTES BLD QL AUTO: 1.1 %
LYMPHOCYTES # SPEC AUTO: 2.23 X 10*3/UL (ref 0.9–5)
LYMPHOCYTES NFR SPEC AUTO: 23.7 %
M PNEUMO IGG SER IA-ACNC: 2.01 INDEX (ref ?–0.9)
M PNEUMO IGM SER QL IA: 0.25 INDEX (ref ?–0.9)
MCH RBC QN AUTO: 27.4 PG (ref 27–32)
MCHC RBC AUTO-ENTMCNC: 31.4 G/DL (ref 32–37)
MCV RBC AUTO: 87.3 FL (ref 80–97)
MONOCYTES # BLD AUTO: 0.96 X 10*3/UL (ref 0.2–1)
MONOCYTES NFR BLD AUTO: 10.2 %
NEUTROPHILS # BLD AUTO: 5.98 X 10*3/UL (ref 1.8–7.7)
NEUTROPHILS NFR BLD AUTO: 63.4 %
NRBC BLD AUTO-RTO: 0 /100 WBCS (ref 0–0)
PLATELET # BLD AUTO: 308 X 10*3/UL (ref 140–440)
POTASSIUM SERPL-SCNC: 3.8 MMOL/L (ref 3.5–5.5)
PROT SERPL-MCNC: 5.4 G/DL (ref 6.2–8.2)
SODIUM SERPL-SCNC: 140 MMOL/L (ref 135–145)
WBC # BLD AUTO: 9.42 X 10*3/UL (ref 4.5–10)

## 2022-07-18 RX ADMIN — CEFAZOLIN SCH: 330 INJECTION, POWDER, FOR SOLUTION INTRAMUSCULAR; INTRAVENOUS at 17:04

## 2022-07-18 RX ADMIN — CEFAZOLIN SCH MLS/HR: 330 INJECTION, POWDER, FOR SOLUTION INTRAMUSCULAR; INTRAVENOUS at 01:36

## 2022-07-18 RX ADMIN — HEPARIN SODIUM SCH UNIT: 5000 INJECTION INTRAVENOUS; SUBCUTANEOUS at 17:03

## 2022-07-18 RX ADMIN — KETOROLAC TROMETHAMINE SCH MG: 15 INJECTION, SOLUTION INTRAMUSCULAR; INTRAVENOUS at 11:02

## 2022-07-18 RX ADMIN — METRONIDAZOLE SCH MLS/HR: 500 INJECTION, SOLUTION INTRAVENOUS at 11:34

## 2022-07-18 RX ADMIN — HEPARIN SODIUM SCH UNIT: 5000 INJECTION INTRAVENOUS; SUBCUTANEOUS at 08:56

## 2022-07-18 RX ADMIN — ACETAMINOPHEN SCH MLS/HR: 10 INJECTION, SOLUTION INTRAVENOUS at 05:35

## 2022-07-18 RX ADMIN — HEPARIN SODIUM SCH UNIT: 5000 INJECTION INTRAVENOUS; SUBCUTANEOUS at 00:17

## 2022-07-18 RX ADMIN — KETOROLAC TROMETHAMINE SCH MG: 15 INJECTION, SOLUTION INTRAMUSCULAR; INTRAVENOUS at 00:17

## 2022-07-18 RX ADMIN — KETOROLAC TROMETHAMINE SCH MG: 15 INJECTION, SOLUTION INTRAMUSCULAR; INTRAVENOUS at 05:36

## 2022-07-18 RX ADMIN — ACETAMINOPHEN SCH MLS/HR: 10 INJECTION, SOLUTION INTRAVENOUS at 11:03

## 2022-07-18 RX ADMIN — KETOROLAC TROMETHAMINE SCH MG: 15 INJECTION, SOLUTION INTRAMUSCULAR; INTRAVENOUS at 17:00

## 2022-07-18 RX ADMIN — METRONIDAZOLE SCH MLS/HR: 500 INJECTION, SOLUTION INTRAVENOUS at 17:02

## 2022-07-18 RX ADMIN — CEFAZOLIN SCH MLS/HR: 330 INJECTION, POWDER, FOR SOLUTION INTRAMUSCULAR; INTRAVENOUS at 11:02

## 2022-07-18 RX ADMIN — METRONIDAZOLE SCH MLS/HR: 500 INJECTION, SOLUTION INTRAVENOUS at 05:36

## 2022-07-18 RX ADMIN — METRONIDAZOLE SCH MLS/HR: 500 INJECTION, SOLUTION INTRAVENOUS at 00:17

## 2022-07-18 NOTE — P.PN
Subjective


Progress Note Date: 07/18/22 (delayed charting seen at 1130)


Patient is a 66-year-old female with dyslipidemia, chronic abdominal pain, and 

osteoarthritis who presented with complaints of worsening abdominal pain.  The 

ER she underwent an extensive evaluation.  CT abdomen and pelvis revealed 

diverticulitis with abscess.  Laboratory analysis showed a white blood cell 

count of 13.1, lactic acid 1.2, and mildly elevated transaminases.  She was 

admitted and was started on IV fluids and antibiotics.  Surgery was consulted.  

Infectious disease was consulted her Levaquin was discontinued and she was 

started on Rocephin.  She continued to improve on her IV antibiotics.





Patient seen and examined at bedside.  Her abdominal pain is much improved.  She

had a normal for bowel movement yesterday.  She denies any nausea or vomiting.  

She is feeling hungry.





General: nontoxic, no distress, appears at stated age


Derm: warm, dry


Head: atraumatic, normocephalic, symmetric


Eyes: EOMI, no lid lag, anicteric sclera


Mouth: no lip lesion, mucus membranes moist


Cardiovascular: S1S2 reg, no murmur, positive posterior tibial pulse bilateral, 


Lungs: CTA bilateral, no rhonchi, no rales , no accessory muscle use


Abdominal: soft,  nontender to palpation, no guarding, no appreciable 

organomegaly


Ext: no gross muscle atrophy, no edema, no contractures


Neuro:  CN II-XI grossly intact, no focal neuro deficits


Psych: Alert, oriented, appropriate affect 





Assessment/plan:


Acute diverticulitis with abscess and sepsis present on admission


-Decrease IV fluids


-Continue with Rocephin and Flagyl


-ID and surgery recommendations appreciated


-Add oral Norco


-Anticipate patient's diet to be increased by surgery





Dyslipidemia


-Resume statin





Anticipate home in a.m.























Objective





- Vital Signs


Vital signs: 


                                   Vital Signs











Temp  98.5 F   07/18/22 07:21


 


Pulse  63   07/18/22 07:21


 


Resp  16   07/18/22 07:21


 


BP  128/79   07/18/22 07:21


 


Pulse Ox  95   07/18/22 08:44


 


FiO2      








                                 Intake & Output











 07/17/22 07/18/22 07/18/22





 18:59 06:59 18:59


 


Intake Total  1070 


 


Balance  1070 


 


Weight   85.275 kg


 


Intake:   


 


  Oral  1070 


 


Other:   


 


  # Voids 4 3 














- Labs


CBC & Chem 7: 


                                 07/18/22 05:53





                                 07/18/22 05:53


Labs: 


                  Abnormal Lab Results - Last 24 Hours (Table)











  07/15/22 07/18/22 07/18/22 Range/Units





  16:35 05:53 05:53 


 


Hgb   11.4 L   (12.0-15.0)  g/dL


 


Hct   36.3 L   (37.2-46.3)  %


 


MCHC   31.4 L   (32.0-37.0)  g/dL


 


Immature Gran #   0.10 H   (0.00-0.04)  X 10*3/uL


 


BUN    3.9 L  (9.0-27.0)  mg/dL


 


BUN/Creatinine Ratio    6.11 L  (12.00-20.00)  Ratio


 


Total Bilirubin    0.20 L  (0.30-1.20)  mg/dL


 


Total Protein    5.4 L  (6.2-8.2)  g/dL


 


Albumin    3.0 L  (3.8-4.9)  g/dL


 


Albumin/Globulin Ratio    1.25 L  (1.60-3.17)  g/dL


 


Mycoplasma pneumon IgG  2.01 H    (<=0.90)  INDEX








                      Microbiology - Last 24 Hours (Table)











 07/15/22 20:00 Blood Culture - Preliminary





 Blood    No Growth after 48 hours


 


 07/15/22 19:45 Blood Culture - Preliminary





 Blood    No Growth after 48 hours

## 2022-07-18 NOTE — P.PN
Subjective


Progress Note Date: 07/18/22





CHIEF COMPLAINT: Diverticulitis





HISTORY OF PRESENT ILLNESS: Patient reports improvement in her abdominal pain.  

She denies any nausea or vomiting.  She reports having a bowel movement.  This 

morning she had reported some discomfort after taking the clear liquids.  That 

this has now resolved.  Her white count has normalized from 11.32 to 9.42.  

She's afebrile.  Patient seen by infectious disease.  Antibiotics adjusted.





PHYSICAL EXAM: 


VITAL SIGNS: Reviewed


GENERAL: Well-developed in no acute distress. 


HEENT:  No sclera icterus. Extraocular movements grossly intact.  Moist buccal 

mucosa. 


Head is atraumatic, normocephalic. Hears conversational speech. No nasal 

drainage.


NECK:  Supple without lymphadenopathy.


CHEST:  Non-labored respirations and equal bilateral excursions. 


CARDIOVASCULAR:   Palpable 2+ radial pulses.


ABDOMEN:  Soft.  Nondistended. Nontender.


MUSCULOSKELETAL:  No clubbing or cyanosis.


NEUROLOGIC:  No focal or lateralizing signs.  Cranial nerves II through XII 

grossly intact.


PSYCH:  Appropriate affect.  Alert and oriented to person, place and time.


SKIN: Well perfused.  Good skin turgor. 





ASSESSMENT: 


1.  Sigmoid diverticulitis with abscess








PLAN: 


-Advance diet to full liquids for dinner and then low fiber in the morning 


-Repeat CBC in a.m.


-Consult dietitian for diverticular diet education


-Encouraged patient to ambulate


-Continue antibiotics per infectious disease


-Continued scheduled non-narcotic pain meds





Physician Assistant note has been reviewed by physician. Signing provider agrees

with the documented findings, assessment, and plan of care. 














CHIEF COMPLAINT: Diverticulitis





HISTORY OF PRESENT ILLNESS: The patient is a 66 year old female who presented 

with complicated diverticulitis.  Patient's antibiotics were adjusted per 

infectious disease and now with blood cell count is improving.  Patient notes 

improvement of her abdominal pain compared to yesterday. 





REVIEW OF ORGAN SYSTEMS: No fevers or chills. Denies pneumonia. Denies fatty 

food intolerance.  





PHYSICAL EXAM:


VITALS: Reviewed


CONSTITUTIONAL:  Well developed and in no acute distress. 


EYES:  Conjuctivae without sclera icterus.  Extraocular movements grossly 

intact. 


HEAD, EARS, NOSE, THROAT: Moist buccal mucosa. Head is atraumatic, 

normocephalic. Hears conversational speech. No nasal drainage. 


RESPIRATORY:  Non-labored respirations and equal bilateral excursions. No gross 

wheezes. 


CARDIOVASCULAR:   Palpable 2+ radial pulses.


ABDOMEN:  No peritonitis. 


MUSCULOSKELETAL:  No clubbing, cyanosis or edema.


SKIN:  Warm and well perfused with good skin turgor.


NEUROLOGIC: Cranial nerves II through XII grossly intact.  No focal or 

lateralizing signs. 


PSYCH:  Appropriate affect.  Alert and oriented to person, place and time. 

Displays appropriate insight.





CLINCAL LABS: Reviewed. WBC elevated 11.6 to 11.2, now normal 9.4





ASSESSMENT: 


1. Sigmoid diverticulitis





PLAN: 


1.  Continue with clear liquid diet but may advance to full liquid diet for 

dinner.


2.  Recommend repeat CBC for trend responsive to treatment and advancement of 

diet


3.  Discharge pending no rebound leukocytosis, tolerating diet


4.  Anticipate low fiber diet in 24 hours.





Objective





- Vital Signs


Vital signs: 


                                   Vital Signs











Temp  98.5 F   07/18/22 07:21


 


Pulse  63   07/18/22 07:21


 


Resp  16   07/18/22 07:21


 


BP  128/79   07/18/22 07:21


 


Pulse Ox  95   07/18/22 08:44


 


FiO2      








                                 Intake & Output











 07/17/22 07/18/22 07/18/22





 18:59 06:59 18:59


 


Intake Total  1070 


 


Balance  1070 


 


Intake:   


 


  Oral  1070 


 


Other:   


 


  # Voids 4 3 














- Labs


CBC & Chem 7: 


                                 07/18/22 05:53





                                 07/18/22 05:53


Labs: 


                  Abnormal Lab Results - Last 24 Hours (Table)











  07/15/22 07/18/22 07/18/22 Range/Units





  16:35 05:53 05:53 


 


Hgb   11.4 L   (12.0-15.0)  g/dL


 


Hct   36.3 L   (37.2-46.3)  %


 


MCHC   31.4 L   (32.0-37.0)  g/dL


 


Immature Gran #   0.10 H   (0.00-0.04)  X 10*3/uL


 


BUN    3.9 L  (9.0-27.0)  mg/dL


 


BUN/Creatinine Ratio    6.11 L  (12.00-20.00)  Ratio


 


Total Bilirubin    0.20 L  (0.30-1.20)  mg/dL


 


Total Protein    5.4 L  (6.2-8.2)  g/dL


 


Albumin    3.0 L  (3.8-4.9)  g/dL


 


Albumin/Globulin Ratio    1.25 L  (1.60-3.17)  g/dL


 


Mycoplasma pneumon IgG  2.01 H    (<=0.90)  INDEX








                      Microbiology - Last 24 Hours (Table)











 07/15/22 20:00 Blood Culture - Preliminary





 Blood    No Growth after 48 hours


 


 07/15/22 19:45 Blood Culture - Preliminary





 Blood    No Growth after 48 hours

## 2022-07-19 VITALS
SYSTOLIC BLOOD PRESSURE: 128 MMHG | HEART RATE: 61 BPM | DIASTOLIC BLOOD PRESSURE: 78 MMHG | TEMPERATURE: 97.9 F | RESPIRATION RATE: 16 BRPM

## 2022-07-19 LAB
ALBUMIN SERPL-MCNC: 2.9 G/DL (ref 3.5–5)
ALBUMIN/GLOB SERPL: 1.1 {RATIO}
ALP SERPL-CCNC: 74 U/L (ref 38–126)
ALT SERPL-CCNC: 14 U/L (ref 4–34)
ANION GAP SERPL CALC-SCNC: 5 MMOL/L
AST SERPL-CCNC: 23 U/L (ref 14–36)
BASOPHILS # BLD AUTO: 0 K/UL (ref 0–0.2)
BASOPHILS NFR BLD AUTO: 1 %
BUN SERPL-SCNC: 4 MG/DL (ref 7–17)
CALCIUM SPEC-MCNC: 8.5 MG/DL (ref 8.4–10.2)
CHLORIDE SERPL-SCNC: 109 MMOL/L (ref 98–107)
CO2 SERPL-SCNC: 25 MMOL/L (ref 22–30)
EOSINOPHIL # BLD AUTO: 0.1 K/UL (ref 0–0.7)
EOSINOPHIL NFR BLD AUTO: 1 %
ERYTHROCYTE [DISTWIDTH] IN BLOOD BY AUTOMATED COUNT: 4.31 M/UL (ref 3.8–5.4)
ERYTHROCYTE [DISTWIDTH] IN BLOOD: 13.7 % (ref 11.5–15.5)
GLOBULIN SER CALC-MCNC: 2.7 G/DL
GLUCOSE SERPL-MCNC: 92 MG/DL (ref 74–99)
HCT VFR BLD AUTO: 38.3 % (ref 34–46)
HGB BLD-MCNC: 12.2 GM/DL (ref 11.4–16)
LYMPHOCYTES # SPEC AUTO: 1.8 K/UL (ref 1–4.8)
LYMPHOCYTES NFR SPEC AUTO: 21 %
MCH RBC QN AUTO: 28.3 PG (ref 25–35)
MCHC RBC AUTO-ENTMCNC: 31.8 G/DL (ref 31–37)
MCV RBC AUTO: 88.9 FL (ref 80–100)
MONOCYTES # BLD AUTO: 0.6 K/UL (ref 0–1)
MONOCYTES NFR BLD AUTO: 7 %
NEUTROPHILS # BLD AUTO: 6 K/UL (ref 1.3–7.7)
NEUTROPHILS NFR BLD AUTO: 69 %
PLATELET # BLD AUTO: 333 K/UL (ref 150–450)
POTASSIUM SERPL-SCNC: 3.9 MMOL/L (ref 3.5–5.1)
PROT SERPL-MCNC: 5.6 G/DL (ref 6.3–8.2)
SODIUM SERPL-SCNC: 139 MMOL/L (ref 137–145)
WBC # BLD AUTO: 8.6 K/UL (ref 3.8–10.6)

## 2022-07-19 RX ADMIN — HEPARIN SODIUM SCH: 5000 INJECTION INTRAVENOUS; SUBCUTANEOUS at 08:46

## 2022-07-19 RX ADMIN — METRONIDAZOLE SCH MLS/HR: 500 INJECTION, SOLUTION INTRAVENOUS at 06:14

## 2022-07-19 RX ADMIN — KETOROLAC TROMETHAMINE SCH MG: 15 INJECTION, SOLUTION INTRAMUSCULAR; INTRAVENOUS at 06:14

## 2022-07-19 RX ADMIN — CEFAZOLIN SCH MLS/HR: 330 INJECTION, POWDER, FOR SOLUTION INTRAMUSCULAR; INTRAVENOUS at 06:14

## 2022-07-19 RX ADMIN — KETOROLAC TROMETHAMINE SCH MG: 15 INJECTION, SOLUTION INTRAMUSCULAR; INTRAVENOUS at 00:45

## 2022-07-19 RX ADMIN — METRONIDAZOLE SCH MLS/HR: 500 INJECTION, SOLUTION INTRAVENOUS at 00:46

## 2022-07-19 RX ADMIN — HEPARIN SODIUM SCH UNIT: 5000 INJECTION INTRAVENOUS; SUBCUTANEOUS at 00:45

## 2022-07-19 NOTE — P.PN
Subjective


Progress Note Date: 07/18/22


Principal diagnosis: 


Acute diverticulitis with intra-mural abscess





Patient is a 66 year old  female presenting to the hospital with 

abdominal pain and has been diagnosed with acute diverticulitis and concern for 

intramural abscess, patient with penicillin ALLERGY.


On today's evaluation that is 07/18/2022, the patient denies having any fever or

any chills, the patient is breathing comfortably, the patient lower abdominal 

pain has decreased in intensity no nausea no vomiting no diarrhea








Objective





- Vital Signs


Vital signs: 


                                   Vital Signs











Temp  98.5 F   07/18/22 07:21


 


Pulse  63   07/18/22 07:21


 


Resp  16   07/18/22 07:21


 


BP  128/79   07/18/22 07:21


 


Pulse Ox  95   07/18/22 08:44


 


FiO2      








                                 Intake & Output











 07/17/22 07/18/22 07/18/22





 18:59 06:59 18:59


 


Intake Total  1070 


 


Balance  1070 


 


Intake:   


 


  Oral  1070 


 


Other:   


 


  # Voids 4 3 














- Exam


GENERAL DESCRIPTION: An elderly female lying in bed in no distress





RESPIRATORY SYSTEM: Unlabored breathing , decreased breath sounds at bases





HEART: S1 S2 regular rate and rhythm ,





ABDOMEN: Soft , no tenderness





EXTREMITIES: No edema feet








- Labs


CBC & Chem 7: 


                                 07/19/22 06:23





                                 07/19/22 06:23


Labs: 


                  Abnormal Lab Results - Last 24 Hours (Table)











  07/15/22 07/17/22 07/18/22 Range/Units





  16:35 05:33 05:53 


 


WBC   11.32 H   (4.50-10.00)  X 10*3/uL


 


RBC   4.03 L   (4.10-5.20)  X 10*6/uL


 


Hgb   11.2 L  11.4 L  (12.0-15.0)  g/dL


 


Hct   35.6 L  36.3 L  (37.2-46.3)  %


 


MCHC   31.5 L  31.4 L  (32.0-37.0)  g/dL


 


Immature Gran #   0.11 H  0.10 H  (0.00-0.04)  X 10*3/uL


 


Neutrophils #   8.15 H   (1.80-7.70)  X 10*3/uL


 


BUN     (9.0-27.0)  mg/dL


 


BUN/Creatinine Ratio     (12.00-20.00)  Ratio


 


Total Bilirubin     (0.30-1.20)  mg/dL


 


Total Protein     (6.2-8.2)  g/dL


 


Albumin     (3.8-4.9)  g/dL


 


Albumin/Globulin Ratio     (1.60-3.17)  g/dL


 


Mycoplasma pneumon IgG  2.01 H    (<=0.90)  INDEX














  07/18/22 Range/Units





  05:53 


 


WBC   (4.50-10.00)  X 10*3/uL


 


RBC   (4.10-5.20)  X 10*6/uL


 


Hgb   (12.0-15.0)  g/dL


 


Hct   (37.2-46.3)  %


 


MCHC   (32.0-37.0)  g/dL


 


Immature Gran #   (0.00-0.04)  X 10*3/uL


 


Neutrophils #   (1.80-7.70)  X 10*3/uL


 


BUN  3.9 L  (9.0-27.0)  mg/dL


 


BUN/Creatinine Ratio  6.11 L  (12.00-20.00)  Ratio


 


Total Bilirubin  0.20 L  (0.30-1.20)  mg/dL


 


Total Protein  5.4 L  (6.2-8.2)  g/dL


 


Albumin  3.0 L  (3.8-4.9)  g/dL


 


Albumin/Globulin Ratio  1.25 L  (1.60-3.17)  g/dL


 


Mycoplasma pneumon IgG   (<=0.90)  INDEX








                      Microbiology - Last 24 Hours (Table)











 07/15/22 20:00 Blood Culture - Preliminary





 Blood    No Growth after 48 hours


 


 07/15/22 19:45 Blood Culture - Preliminary





 Blood    No Growth after 48 hours














Assessment and Plan


(1) Abscess of sigmoid colon due to diverticulitis


Current Visit: Yes   Status: Acute   Code(s): K57.20 - DVTRCLI OF LG INT W 

PERFORATION AND ABSCESS W/O BLEEDING   SNOMED Code(s): 0740640539200358


   


Plan: 





1patient presented hospital with abdominal pain nausea vomiting did have 

elevated white count has been diagnosed with acute sigmoid diverticulitis with 

no evidence of any perforation or extramural abscess there was a question of 

possible intramural abscess and will need to call for the enteric gram-negative 

both aerobes and anaerobes.


2patient to have a penicillin allergy however did mention she has taken 

Augmentin without any problem and used to get before Before Her Dental Procedure

and Seem to Have Been Doing Okay with That Clinical doubt true Penicillin 

Allergy.  Information needed to be confirmed with the pharmacy


3 patient to continue with Rocephin 2 g daily and Flagyl and monitor clinical 

course closely


Time with Patient: Less than 30

## 2022-07-19 NOTE — P.DS
Providers


Date of admission: 


07/15/22 18:41





Expected date of discharge: 07/19/22


Attending physician: 


Linda Orta MD





Consults: 





                                        





07/15/22 18:36


Consult Physician Stat 


   Consulting Provider: Lillie Mims


   Consult Reason/Comments: Sigmoid diverticulitis with abscess


   Do you want consulting provider notified?: Already Contacted





07/17/22 12:48


Consult Physician Routine 


   Consulting Provider: Abdirizak Starks


   Consult Reason/Comments: Complicated diverticulitis, antibiotic management


   Do you want consulting provider notified?: Yes











Primary care physician: 


Petty Bojorquez MD





Hospital Course: 


Discharge Diagnosis:


Acute diverticulitis with abscess and sepsis present on admission


Dyslipidemia





Hospital Course: 


Patient is a 66-year-old female with dyslipidemia, chronic abdominal pain, and 

osteoarthritis who presented with complaints of worsening abdominal pain.  The 

ER she underwent an extensive evaluation.  CT abdomen and pelvis revealed 

diverticulitis with abscess.  Laboratory analysis showed a white blood cell 

count of 13.1, lactic acid 1.2, and mildly elevated transaminases.  She was 

admitted and was started on IV fluids and antibiotics.  Surgery was consulted.  

Infectious disease was consulted her Levaquin was discontinued and she was 

started on Rocephin.  She continued to improve on her IV antibiotics.  She was 

able to tolerate a low fiber diet.  Pain was controlled and she was determined 

stable for discharge.








Discharge instructions: Follow with Dr. Bojorquez in 1-2 days, Dr. Mims in 2 

week and Dr. Adames next month for work up of IBS. Cipro and flagyl X 10 days. 

Low fiber diet.  





Patient seen and examined at bedside.





Vital signs reviewed and stable. 


General: nontoxic, no distress, appears at stated age


Derm: warm, dry


Head: atraumatic, normocephalic, symmetric


Eyes: EOMI, no lid lag, anicteric sclera


Mouth: no lip lesion, mucus membranes moist


Cardiovascular: S1S2 reg, no murmur, positive posterior tibial pulse bilateral, 


Lungs: CTA bilateral, no rhonchi, no rales , no accessory muscle use


Abdominal: soft,  nontender to palpation, no guarding, no appreciable 

organomegaly


Ext: no gross muscle atrophy, no edema, no contractures


Neuro:  CN II-XI grossly intact, no focal neuro deficits


Psych: Alert, oriented, appropriate affect 








A total of 32 minutes of time were spent preparing this complex discharge 

summary.


Patient was discharged on 7/19/22. 





Patient Condition at Discharge: Stable





Plan - Discharge Summary


Discharge Rx Participant: Yes


New Discharge Prescriptions: 


New


   metroNIDAZOLE [Flagyl] 500 mg PO TID #30 tab


   Ciprofloxacin HCl [Cipro] 500 mg PO BID 10 Days #20 tab





Continue


   Atorvastatin [Lipitor] 10 mg PO DAILY


Discharge Medication List





Atorvastatin [Lipitor] 10 mg PO DAILY 11/13/20 [History]


Ciprofloxacin HCl [Cipro] 500 mg PO BID 10 Days #20 tab 07/19/22 [Rx]


metroNIDAZOLE [Flagyl] 500 mg PO TID #30 tab 07/19/22 [Rx]








Follow up Appointment(s)/Referral(s): 


Petty Bojorquez MD [Primary Care Provider] - 07/26/22 11:45 am


Lillie Mims MD [STAFF PHYSICIAN] - 08/02/22


Valeria Adames MD [STAFF PHYSICIAN] - 08/23/22 3:00 pm


(


)


Patient Instructions/Handouts:  Diverticulitis (DC), Low Fiber Diet (DC), 

Diverticulitis Diet (DC)


Activity/Diet/Wound Care/Special Instructions: 


Activity:


as tolerated





Diet: 


Low Fiber





Discharge Disposition: HOME SELF-CARE

## 2022-07-19 NOTE — P.PN
Subjective


Progress Note Date: 07/19/22





CHIEF COMPLAINT: Diverticulitis





HISTORY OF PRESENT ILLNESS: Patient denies any abdominal pain.  She tolerated a 

low fiber diet for breakfast.  She did report a little nausea this morning after

they started antibiotic.  This has improved.  She is having flatus.  Afebrile.  

White count remains normal at 8.6.  Patient being discharged this afternoon.  





PHYSICAL EXAM: 


VITAL SIGNS: Reviewed


GENERAL: Well-developed in no acute distress. 


HEENT:  No sclera icterus. Extraocular movements grossly intact.  Moist buccal 

mucosa. 


Head is atraumatic, normocephalic. Hears conversational speech. No nasal draina

ge.


NECK:  Supple without lymphadenopathy.


CHEST:  Non-labored respirations and equal bilateral excursions. 


CARDIOVASCULAR:   Palpable 2+ radial pulses.


ABDOMEN:  Soft.  Nondistended. Nontender.


MUSCULOSKELETAL:  No clubbing or cyanosis.


NEUROLOGIC:  No focal or lateralizing signs.  Cranial nerves II through XII 

grossly intact.


PSYCH:  Appropriate affect.  Alert and oriented to person, place and time.


SKIN: Well perfused.  Good skin turgor. 





ASSESSMENT: 


1.  Sigmoid diverticulitis with abscess








PLAN:


-Patient can be discharged from surgical standpoint


-Continue a low fiber diet


-Discharge Antibiotics per infectious disease 











Physician Assistant note has been reviewed by physician. Signing provider agrees

with the documented findings, assessment, and plan of care. 





Objective





- Vital Signs


Vital signs: 


                                   Vital Signs











Temp  97.9 F   07/19/22 07:40


 


Pulse  61   07/19/22 07:40


 


Resp  16   07/19/22 07:40


 


BP  128/78   07/19/22 07:40


 


Pulse Ox  95   07/19/22 07:40


 


FiO2      








                                 Intake & Output











 07/18/22 07/19/22 07/19/22





 18:59 06:59 18:59


 


Intake Total 380 480 320


 


Balance 380 480 320


 


Weight 85.275 kg  


 


Intake:   


 


  Oral 380 480 320


 


Other:   


 


  Voiding Method   Toilet


 


  # Voids 2 2 














- Labs


CBC & Chem 7: 


                                 07/19/22 06:23





                                 07/19/22 06:23


Labs: 


                  Abnormal Lab Results - Last 24 Hours (Table)











  07/19/22 Range/Units





  06:23 


 


Chloride  109 H  ()  mmol/L


 


BUN  4 L  (7-17)  mg/dL


 


Total Protein  5.6 L  (6.3-8.2)  g/dL


 


Albumin  2.9 L  (3.5-5.0)  g/dL








                      Microbiology - Last 24 Hours (Table)











 07/15/22 20:00 Blood Culture - Preliminary





 Blood    No Growth after 72 hours


 


 07/15/22 19:45 Blood Culture - Preliminary





 Blood    No Growth after 72 hours

## 2022-08-02 ENCOUNTER — HOSPITAL ENCOUNTER (OUTPATIENT)
Dept: HOSPITAL 47 - CPPFTMAIN | Age: 67
End: 2022-08-02
Attending: INTERNAL MEDICINE
Payer: MEDICARE

## 2022-08-02 DIAGNOSIS — Z88.0: ICD-10-CM

## 2022-08-02 DIAGNOSIS — Z88.1: ICD-10-CM

## 2022-08-02 DIAGNOSIS — R05.9: Primary | ICD-10-CM

## 2022-08-02 PROCEDURE — 94726 PLETHYSMOGRAPHY LUNG VOLUMES: CPT

## 2022-08-02 PROCEDURE — 94729 DIFFUSING CAPACITY: CPT

## 2022-08-02 PROCEDURE — 94060 EVALUATION OF WHEEZING: CPT

## 2022-08-04 ENCOUNTER — HOSPITAL ENCOUNTER (OUTPATIENT)
Dept: HOSPITAL 47 - RADUSWWP | Age: 67
Discharge: HOME | End: 2022-08-04
Attending: OBSTETRICS & GYNECOLOGY
Payer: MEDICARE

## 2022-08-04 DIAGNOSIS — N83.9: Primary | ICD-10-CM

## 2022-08-04 PROCEDURE — 76856 US EXAM PELVIC COMPLETE: CPT

## 2022-08-04 NOTE — US
EXAMINATION TYPE: US pelvic complete

 

DATE OF EXAM: 8/4/2022

 

COMPARISON: Correlation CT 7/15/2022

 

CLINICAL HISTORY: 67-year-old female N83.9 Lesion left ovary.

 

TECHNIQUE: Transabdominal sonographic images of the pelvis were acquired.  Transvaginal sonographic i
mages were attempted, unable to obtain diagnostic images transvaginally due to large size of the cyst
s. 

 

Date of LMP:  Hysterectomy 20-30 years ago

Patient states she has both her ovaries and both of her kidneys.  

 

FINDINGS:

 

EXAM MEASUREMENTS:

 

Patient had diverticulum rupture on July 8, 2022, she came to ER July 15th and was admitted to the [facility] for 4 days. 

Currently she had symptoms of fatigue, aching in her pelvis and diarrhea. 

 

Uterus:  Surgically absent

 

Right adnexa and extending to the midline demonstrates 9.2 x 5.6 x 5.6cm complex cystic mass. Lobulat
ed mural soft tissue measuring up to 5.6 cm.

 

Left adnexa shows possible left ovary measuring 7.4 x 5.7 x 5.5cm with cystic areas as well as thicke
cheryl dictation to 9 mm and mural nodularity.

No pelvic free fluid seen.

 

 

 

IMPRESSION:

Complex cystic areas within both adnexa measuring up to 9.2 cm on the right and 7.4 cm on the left. U
nable to exclude underlying cystic epithelial ovarian neoplasms including cystadenocarcinomas. Consid
er repeat CT to assess for changes compared to 7/15/2022.

## 2022-10-03 ENCOUNTER — HOSPITAL ENCOUNTER (EMERGENCY)
Dept: HOSPITAL 47 - EC | Age: 67
LOS: 1 days | Discharge: OTHER INSTITUTION | End: 2022-10-04
Payer: MEDICARE

## 2022-10-03 VITALS — TEMPERATURE: 98.5 F

## 2022-10-03 DIAGNOSIS — L02.91: ICD-10-CM

## 2022-10-03 DIAGNOSIS — M19.90: ICD-10-CM

## 2022-10-03 DIAGNOSIS — K68.11: ICD-10-CM

## 2022-10-03 DIAGNOSIS — R65.20: ICD-10-CM

## 2022-10-03 DIAGNOSIS — Z79.899: ICD-10-CM

## 2022-10-03 DIAGNOSIS — K21.9: ICD-10-CM

## 2022-10-03 DIAGNOSIS — E78.5: ICD-10-CM

## 2022-10-03 DIAGNOSIS — N13.30: ICD-10-CM

## 2022-10-03 DIAGNOSIS — A41.9: Primary | ICD-10-CM

## 2022-10-03 LAB
ALBUMIN SERPL-MCNC: 2.8 G/DL (ref 3.5–5)
ALP SERPL-CCNC: 135 U/L (ref 38–126)
ALT SERPL-CCNC: 24 U/L (ref 4–34)
ANION GAP SERPL CALC-SCNC: 13 MMOL/L
AST SERPL-CCNC: 41 U/L (ref 14–36)
BASOPHILS # BLD AUTO: 0.1 K/UL (ref 0–0.2)
BASOPHILS NFR BLD AUTO: 0 %
BILIRUB UR QL STRIP.AUTO: (no result)
BUN SERPL-SCNC: 17 MG/DL (ref 7–17)
CALCIUM SPEC-MCNC: 9.1 MG/DL (ref 8.4–10.2)
CHLORIDE SERPL-SCNC: 97 MMOL/L (ref 98–107)
CO2 SERPL-SCNC: 24 MMOL/L (ref 22–30)
EOSINOPHIL # BLD AUTO: 0 K/UL (ref 0–0.7)
EOSINOPHIL NFR BLD AUTO: 0 %
ERYTHROCYTE [DISTWIDTH] IN BLOOD BY AUTOMATED COUNT: 3.3 M/UL (ref 3.8–5.4)
ERYTHROCYTE [DISTWIDTH] IN BLOOD: 16.2 % (ref 11.5–15.5)
GLUCOSE SERPL-MCNC: 128 MG/DL (ref 74–99)
HCT VFR BLD AUTO: 29.3 % (ref 34–46)
HGB BLD-MCNC: 9.3 GM/DL (ref 11.4–16)
INR PPP: 1.2 (ref ?–1.2)
LIPASE SERPL-CCNC: 40 U/L (ref 23–300)
LYMPHOCYTES # SPEC AUTO: 1.5 K/UL (ref 1–4.8)
LYMPHOCYTES NFR SPEC AUTO: 6 %
MCH RBC QN AUTO: 28 PG (ref 25–35)
MCHC RBC AUTO-ENTMCNC: 31.6 G/DL (ref 31–37)
MCV RBC AUTO: 88.6 FL (ref 80–100)
MONOCYTES # BLD AUTO: 1.4 K/UL (ref 0–1)
MONOCYTES NFR BLD AUTO: 5 %
NEUTROPHILS # BLD AUTO: 22.3 K/UL (ref 1.3–7.7)
NEUTROPHILS NFR BLD AUTO: 87 %
PH UR: 6 [PH] (ref 5–8)
PLATELET # BLD AUTO: 541 K/UL (ref 150–450)
POTASSIUM SERPL-SCNC: 4.3 MMOL/L (ref 3.5–5.1)
PROT SERPL-MCNC: 5.9 G/DL (ref 6.3–8.2)
PROT UR QL: (no result)
PT BLD: 12.3 SEC (ref 9–12)
RBC UR QL: 2 /HPF (ref 0–5)
SODIUM SERPL-SCNC: 134 MMOL/L (ref 137–145)
SP GR UR: 1.03 (ref 1–1.03)
SQUAMOUS UR QL AUTO: 3 /HPF (ref 0–4)
UROBILINOGEN UR QL STRIP: 8 MG/DL (ref ?–2)
WBC # BLD AUTO: 25.7 K/UL (ref 3.8–10.6)
WBC # UR AUTO: 8 /HPF (ref 0–5)

## 2022-10-03 PROCEDURE — 81001 URINALYSIS AUTO W/SCOPE: CPT

## 2022-10-03 PROCEDURE — 85652 RBC SED RATE AUTOMATED: CPT

## 2022-10-03 PROCEDURE — 99291 CRITICAL CARE FIRST HOUR: CPT

## 2022-10-03 PROCEDURE — 74177 CT ABD & PELVIS W/CONTRAST: CPT

## 2022-10-03 PROCEDURE — 87040 BLOOD CULTURE FOR BACTERIA: CPT

## 2022-10-03 PROCEDURE — 83605 ASSAY OF LACTIC ACID: CPT

## 2022-10-03 PROCEDURE — 86140 C-REACTIVE PROTEIN: CPT

## 2022-10-03 PROCEDURE — 36415 COLL VENOUS BLD VENIPUNCTURE: CPT

## 2022-10-03 PROCEDURE — 96365 THER/PROPH/DIAG IV INF INIT: CPT

## 2022-10-03 PROCEDURE — 84484 ASSAY OF TROPONIN QUANT: CPT

## 2022-10-03 PROCEDURE — 83690 ASSAY OF LIPASE: CPT

## 2022-10-03 PROCEDURE — 96367 TX/PROPH/DG ADDL SEQ IV INF: CPT

## 2022-10-03 PROCEDURE — 93005 ELECTROCARDIOGRAM TRACING: CPT

## 2022-10-03 PROCEDURE — 96376 TX/PRO/DX INJ SAME DRUG ADON: CPT

## 2022-10-03 PROCEDURE — 85610 PROTHROMBIN TIME: CPT

## 2022-10-03 PROCEDURE — 85025 COMPLETE CBC W/AUTO DIFF WBC: CPT

## 2022-10-03 PROCEDURE — 71045 X-RAY EXAM CHEST 1 VIEW: CPT

## 2022-10-03 PROCEDURE — 80053 COMPREHEN METABOLIC PANEL: CPT

## 2022-10-03 PROCEDURE — 96366 THER/PROPH/DIAG IV INF ADDON: CPT

## 2022-10-03 PROCEDURE — 96375 TX/PRO/DX INJ NEW DRUG ADDON: CPT

## 2022-10-03 RX ADMIN — CEFAZOLIN ONE MLS/HR: 330 INJECTION, POWDER, FOR SOLUTION INTRAMUSCULAR; INTRAVENOUS at 20:59

## 2022-10-03 RX ADMIN — CEFAZOLIN ONE MLS/HR: 330 INJECTION, POWDER, FOR SOLUTION INTRAMUSCULAR; INTRAVENOUS at 21:08

## 2022-10-03 NOTE — ED
Abdominal Pain HPI





- General


Chief Complaint: Abdominal Pain


Stated Complaint: Infection


Time Seen by Provider: 10/03/22 20:06


Source: patient, family, RN notes reviewed


Mode of arrival: wheelchair


Limitations: no limitations





- History of Present Illness


Initial Comments: 





This is a pleasant 67-year-old female who presents to the emergency department 

complaining of lower abdominal pain which is developed over the past few days.  

Patient recently had surgery 10 days ago for ovarian excision at [facility], in [place].  Surgeon was Dr. Anand.


Patient states she's had intense pain developing over the lower abdomen which be

came more unbearable today.  Patient is taking oxycodone at home for pain.  

Patient has had some diarrhea and nausea.  Patient was seen by her physician 

today who sent her here to the ER for admission.





No headache, POSITIVE chills, no changes in vision or hearing, no sore throat or

difficulty with speech, no neck pain, no chest pain or shortness of breath, 

POSITIVE abdominal pain, nausea, vomiting, diarrhea no numbness or tingling, no 

extremity pain, no skin rashes or lesions.





Past medical, surgical, social, and family history reviewed.


MD Complaint: abdominal pain





- Related Data


                                Home Medications











 Medication  Instructions  Recorded  Confirmed


 


Atorvastatin [Lipitor] 10 mg PO DAILY 11/13/20 07/15/22








                                  Previous Rx's











 Medication  Instructions  Recorded


 


Ciprofloxacin HCl [Cipro] 500 mg PO BID 10 Days #20 tab 07/19/22


 


metroNIDAZOLE [Flagyl] 500 mg PO TID #30 tab 07/19/22











                                    Allergies











Allergy/AdvReac Type Severity Reaction Status Date / Time


 


No Known Allergies Allergy   Verified 10/03/22 20:14














Review of Systems


ROS Statement: 


Those systems with pertinent positive or pertinent negative responses have been 

documented in the HPI.





ROS Other: All systems not noted in ROS Statement are negative.





Past Medical History


Past Medical History: GERD/Reflux, Hyperlipidemia, Osteoarthritis (OA)


Additional Past Medical History / Comment(s): Cataracts. Patient states she has 

planned surgery for cataract removal in the next few weeks. seasonal allergies, 

covid 8/2022


History of Any Multi-Drug Resistant Organisms: None Reported


Past Surgical History: Hysterectomy, Joint Replacement


Additional Past Surgical History / Comment(s): rt foot bunionectomy, ovaries


Past Anesthesia/Blood Transfusion Reactions: Previous Problems w/ Anesthesia, 

Motion Sickness


Additional Past Anesthesia/Blood Transfusion Reaction / Comment(s): hard time 

waking up


Past Psychological History: No Psychological Hx Reported


Smoking Status: Never smoker


Past Alcohol Use History: None Reported


Past Drug Use History: None Reported





- Past Family History


  ** Father


Family Medical History: Deep Vein Thrombosis (DVT)





  ** Son(s)


Family Medical History: Cancer





General Exam


Limitations: no limitations


General appearance: alert, in no apparent distress, in distress


Head exam: Present: atraumatic, normocephalic, normal inspection


Eye exam: Present: normal appearance, PERRL, EOMI.  Absent: scleral icterus, 

conjunctival injection, periorbital swelling


ENT exam: Present: normal exam, mucous membranes moist


Neck exam: Present: normal inspection, full ROM.  Absent: tenderness, 

meningismus, lymphadenopathy


Respiratory exam: Present: normal lung sounds bilaterally.  Absent: respiratory 

distress, wheezes, rales, rhonchi, stridor, chest wall tenderness, accessory 

muscle use, decreased breath sounds, prolonged expiratory


Cardiovascular Exam: Present: normal rhythm, tachycardia, normal heart sounds.  

Absent: systolic murmur, diastolic murmur, rubs, gallop, clicks


GI/Abdominal exam: Present: tenderness (Exquisite tenderness across the lower 

abdomen.  Guarding, percussion tenderness, no crepitus ascertained.  No 

overlying erythema), guarding, rebound, normal bowel sounds, other (Patient has 

multiple laparoscopic surgical incisions which appear to be healing adequately. 

 Very tender lower abdomen, positive percussion tenderness.).  Absent: 

distended, bruit, pulsatile mass


Extremities exam: Present: normal inspection, full ROM, normal capillary refill.

  Absent: tenderness, pedal edema, joint swelling, calf tenderness


Back exam: Present: normal inspection


Neurological exam: Present: alert, oriented X3, CN II-XII intact


Psychiatric exam: Present: normal affect, normal mood


Skin exam: Present: warm, dry, intact, normal color.  Absent: rash





Course


                                   Vital Signs











  10/03/22 10/03/22 10/03/22





  20:14 20:50 22:14


 


Temperature 98.5 F  


 


Pulse Rate 129 H 118 H 110 H


 


Respiratory 22 20 22





Rate   


 


Blood Pressure 137/82 146/90 120/85


 


O2 Sat by Pulse 95 96 98





Oximetry   














  10/03/22





  22:38


 


Temperature 


 


Pulse Rate 116 H


 


Respiratory 22





Rate 


 


Blood Pressure 133/69


 


O2 Sat by Pulse 96





Oximetry 














- Reevaluation(s)


Reevaluation #1: 





10/03/22 23:07


Patient reevaluated and is somewhat improved.  Normalizing.


Reevaluation #2: 





10/03/22 23:15


Patient reevaluated, capillary refill less than 2 seconds.  Peripheral pulses 

are 2+ out of 4.  No evidence of mottling.  Adequate perfusion.  Patient still 

tachycardic.  Blood pressure is normal.





- Consultations


Consultation #1: 





Case discussed with the patient's hospitalist physician, Dr. Ortiz.  He wants 

me to discuss the case with gynecology here.  Call placed.


Consultation #2: 





Case discussed with on-call gynecology, Dr. Dc who states the patient 

needs to be transferred back to the surgical facility.  All findings discussed, 

case discussed in detail


Consultation #3: 





Case discussed with the gynecological oncology team at [facility], 

discussed with the resident, Dr. Casas.  Accepting transfer physician is Dr. PUJA Anand.





Procedures





- Sepsis


  ** Sepsis Focused Exam #1


Time Sepsis Criteria Met: 21:56


Sepsis Focused Exam Date: 10/03/22


Sepsis Focused Exam Time: 21:56


Sepsis Focused Exam Complete: Yes


Vital Signs & RN Notes Reviewed: Yes


Capillary Refill: < 2 Seconds: Fingers, Toes


Peripheral Pulses: Normal: Radial (R), Radial (L), Posterior Tibialis (R), 

Posterior Tibialis (L), Dorsalis Pedis (R), Dorsalis Pedis (L)


Skin Color: Normal for Patient


Respiratory Exam: normal lung sounds


Cardiovascular Exam: tachycardia





Medical Decision Making





- Medical Decision Making





Given the patient's presentation, postsurgical infectious process is concerning.

  Computed tomography scan was ordered initially.  The patient's tachycardia and

 appearance I ordered Zosyn and metronidazole.  Blood cultures, lactic acid, 

troponin, other general laboratory investigations.





Surgery was done at [facility] by Dr. Anand.





Patient given Zosyn 3.375 g IV piggyback, metronidazole 500 mg IV piggyback, 

vancomycin 1500 mg IV piggyback.





- Lab Data


Result diagrams: 


                                 10/03/22 20:49





                                 10/03/22 20:49


                                   Lab Results











  10/03/22 10/03/22 10/03/22 Range/Units





  20:49 20:49 20:49 


 


WBC  25.7 H    (3.8-10.6)  k/uL


 


RBC  3.30 L    (3.80-5.40)  m/uL


 


Hgb  9.3 L    (11.4-16.0)  gm/dL


 


Hct  29.3 L    (34.0-46.0)  %


 


MCV  88.6    (80.0-100.0)  fL


 


MCH  28.0    (25.0-35.0)  pg


 


MCHC  31.6    (31.0-37.0)  g/dL


 


RDW  16.2 H    (11.5-15.5)  %


 


Plt Count  541 H    (150-450)  k/uL


 


MPV  7.5    


 


Neutrophils %  87    %


 


Lymphocytes %  6    %


 


Monocytes %  5    %


 


Eosinophils %  0    %


 


Basophils %  0    %


 


Neutrophils #  22.3 H    (1.3-7.7)  k/uL


 


Lymphocytes #  1.5    (1.0-4.8)  k/uL


 


Monocytes #  1.4 H    (0-1.0)  k/uL


 


Eosinophils #  0.0    (0-0.7)  k/uL


 


Basophils #  0.1    (0-0.2)  k/uL


 


Hypochromasia  Slight    


 


Anisocytosis  Slight    


 


PT     (9.0-12.0)  sec


 


INR     (<1.2)  


 


Sodium    134 L  (137-145)  mmol/L


 


Potassium    4.3  (3.5-5.1)  mmol/L


 


Chloride    97 L  ()  mmol/L


 


Carbon Dioxide    24  (22-30)  mmol/L


 


Anion Gap    13  mmol/L


 


BUN    17  (7-17)  mg/dL


 


Creatinine    0.66  (0.52-1.04)  mg/dL


 


Est GFR (CKD-EPI)AfAm    >90  (>60 ml/min/1.73 sqM)  


 


Est GFR (CKD-EPI)NonAf    >90  (>60 ml/min/1.73 sqM)  


 


Glucose    128 H  (74-99)  mg/dL


 


Plasma Lactic Acid Doroteo     (0.7-2.0)  mmol/L


 


Calcium    9.1  (8.4-10.2)  mg/dL


 


Total Bilirubin    2.1 H  (0.2-1.3)  mg/dL


 


AST    41 H  (14-36)  U/L


 


ALT    24  (4-34)  U/L


 


Alkaline Phosphatase    135 H  ()  U/L


 


Troponin I     (0.000-0.034)  ng/mL


 


C-Reactive Protein     (<1.0)  mg/dL


 


Total Protein    5.9 L  (6.3-8.2)  g/dL


 


Albumin    2.8 L  (3.5-5.0)  g/dL


 


Lipase    40  ()  U/L


 


Urine Color   Orange   


 


Urine Appearance   Cloudy H   (Clear)  


 


Urine pH   6.0   (5.0-8.0)  


 


Ur Specific Gravity   1.027   (1.001-1.035)  


 


Urine Protein   1+ H   (Negative)  


 


Urine Glucose (UA)   Negative   (Negative)  


 


Urine Ketones   Negative   (Negative)  


 


Urine Blood   Negative   (Negative)  


 


Urine Nitrite   Negative   (Negative)  


 


Urine Bilirubin   1+ H   (Negative)  


 


Urine Urobilinogen   8.0   (<2.0)  mg/dL


 


Ur Leukocyte Esterase   Negative   (Negative)  


 


Urine RBC   2   (0-5)  /hpf


 


Urine WBC   8 H   (0-5)  /hpf


 


Ur Squamous Epith Cells   3   (0-4)  /hpf


 


Amorphous Sediment   Occasional H   (None)  /hpf


 


Urine Bacteria   Rare H   (None)  /hpf


 


Urine Mucus   Many H   (None)  /hpf














  10/03/22 10/03/22 10/03/22 Range/Units





  20:49 20:49 21:49 


 


WBC     (3.8-10.6)  k/uL


 


RBC     (3.80-5.40)  m/uL


 


Hgb     (11.4-16.0)  gm/dL


 


Hct     (34.0-46.0)  %


 


MCV     (80.0-100.0)  fL


 


MCH     (25.0-35.0)  pg


 


MCHC     (31.0-37.0)  g/dL


 


RDW     (11.5-15.5)  %


 


Plt Count     (150-450)  k/uL


 


MPV     


 


Neutrophils %     %


 


Lymphocytes %     %


 


Monocytes %     %


 


Eosinophils %     %


 


Basophils %     %


 


Neutrophils #     (1.3-7.7)  k/uL


 


Lymphocytes #     (1.0-4.8)  k/uL


 


Monocytes #     (0-1.0)  k/uL


 


Eosinophils #     (0-0.7)  k/uL


 


Basophils #     (0-0.2)  k/uL


 


Hypochromasia     


 


Anisocytosis     


 


PT   12.3 H   (9.0-12.0)  sec


 


INR   1.2 H   (<1.2)  


 


Sodium     (137-145)  mmol/L


 


Potassium     (3.5-5.1)  mmol/L


 


Chloride     ()  mmol/L


 


Carbon Dioxide     (22-30)  mmol/L


 


Anion Gap     mmol/L


 


BUN     (7-17)  mg/dL


 


Creatinine     (0.52-1.04)  mg/dL


 


Est GFR (CKD-EPI)AfAm     (>60 ml/min/1.73 sqM)  


 


Est GFR (CKD-EPI)NonAf     (>60 ml/min/1.73 sqM)  


 


Glucose     (74-99)  mg/dL


 


Plasma Lactic Acid Doroteo    1.0  (0.7-2.0)  mmol/L


 


Calcium     (8.4-10.2)  mg/dL


 


Total Bilirubin     (0.2-1.3)  mg/dL


 


AST     (14-36)  U/L


 


ALT     (4-34)  U/L


 


Alkaline Phosphatase     ()  U/L


 


Troponin I  <0.012    (0.000-0.034)  ng/mL


 


C-Reactive Protein     (<1.0)  mg/dL


 


Total Protein     (6.3-8.2)  g/dL


 


Albumin     (3.5-5.0)  g/dL


 


Lipase     ()  U/L


 


Urine Color     


 


Urine Appearance     (Clear)  


 


Urine pH     (5.0-8.0)  


 


Ur Specific Gravity     (1.001-1.035)  


 


Urine Protein     (Negative)  


 


Urine Glucose (UA)     (Negative)  


 


Urine Ketones     (Negative)  


 


Urine Blood     (Negative)  


 


Urine Nitrite     (Negative)  


 


Urine Bilirubin     (Negative)  


 


Urine Urobilinogen     (<2.0)  mg/dL


 


Ur Leukocyte Esterase     (Negative)  


 


Urine RBC     (0-5)  /hpf


 


Urine WBC     (0-5)  /hpf


 


Ur Squamous Epith Cells     (0-4)  /hpf


 


Amorphous Sediment     (None)  /hpf


 


Urine Bacteria     (None)  /hpf


 


Urine Mucus     (None)  /hpf














  10/03/22 Range/Units





  22:37 


 


WBC   (3.8-10.6)  k/uL


 


RBC   (3.80-5.40)  m/uL


 


Hgb   (11.4-16.0)  gm/dL


 


Hct   (34.0-46.0)  %


 


MCV   (80.0-100.0)  fL


 


MCH   (25.0-35.0)  pg


 


MCHC   (31.0-37.0)  g/dL


 


RDW   (11.5-15.5)  %


 


Plt Count   (150-450)  k/uL


 


MPV   


 


Neutrophils %   %


 


Lymphocytes %   %


 


Monocytes %   %


 


Eosinophils %   %


 


Basophils %   %


 


Neutrophils #   (1.3-7.7)  k/uL


 


Lymphocytes #   (1.0-4.8)  k/uL


 


Monocytes #   (0-1.0)  k/uL


 


Eosinophils #   (0-0.7)  k/uL


 


Basophils #   (0-0.2)  k/uL


 


Hypochromasia   


 


Anisocytosis   


 


PT   (9.0-12.0)  sec


 


INR   (<1.2)  


 


Sodium   (137-145)  mmol/L


 


Potassium   (3.5-5.1)  mmol/L


 


Chloride   ()  mmol/L


 


Carbon Dioxide   (22-30)  mmol/L


 


Anion Gap   mmol/L


 


BUN   (7-17)  mg/dL


 


Creatinine   (0.52-1.04)  mg/dL


 


Est GFR (CKD-EPI)AfAm   (>60 ml/min/1.73 sqM)  


 


Est GFR (CKD-EPI)NonAf   (>60 ml/min/1.73 sqM)  


 


Glucose   (74-99)  mg/dL


 


Plasma Lactic Acid Doroteo   (0.7-2.0)  mmol/L


 


Calcium   (8.4-10.2)  mg/dL


 


Total Bilirubin   (0.2-1.3)  mg/dL


 


AST   (14-36)  U/L


 


ALT   (4-34)  U/L


 


Alkaline Phosphatase   ()  U/L


 


Troponin I   (0.000-0.034)  ng/mL


 


C-Reactive Protein  34.8 H  (<1.0)  mg/dL


 


Total Protein   (6.3-8.2)  g/dL


 


Albumin   (3.5-5.0)  g/dL


 


Lipase   ()  U/L


 


Urine Color   


 


Urine Appearance   (Clear)  


 


Urine pH   (5.0-8.0)  


 


Ur Specific Gravity   (1.001-1.035)  


 


Urine Protein   (Negative)  


 


Urine Glucose (UA)   (Negative)  


 


Urine Ketones   (Negative)  


 


Urine Blood   (Negative)  


 


Urine Nitrite   (Negative)  


 


Urine Bilirubin   (Negative)  


 


Urine Urobilinogen   (<2.0)  mg/dL


 


Ur Leukocyte Esterase   (Negative)  


 


Urine RBC   (0-5)  /hpf


 


Urine WBC   (0-5)  /hpf


 


Ur Squamous Epith Cells   (0-4)  /hpf


 


Amorphous Sediment   (None)  /hpf


 


Urine Bacteria   (None)  /hpf


 


Urine Mucus   (None)  /hpf














- EKG Data


EKG Comments: 





EKG done at 2130 reveals sinus tachycardia with a rate of 116.  Normal inter

vals.  Normal axis.  No acute ST or T-wave changes.  Minimal baseline artifact. 

 Normal QRS morphology.





- Radiology Data


Radiology results: report reviewed, image reviewed





Critical Care Time


Critical Care Time: Yes


Total Critical Care Time: 30


Critical Care Time: 





Sepsis, reevaluation of patient, ordering of interventions and diagnostic tests.

  Interpretation diagnostic tests.  Discussion with consultants.





Disposition


Clinical Impression: 


 Sepsis, Postoperative abscess, Pelvic abscess in female, Bilateral 

hydronephrosis





Disposition: OTHER INSTITUTION NOT DEFINED


Condition: Poor


Is patient prescribed a controlled substance at d/c from ED?: No


Time of Disposition: 22:29





- Out of Hospital Transfer - Req. Specs


Out of Hospital Transfer - Requested Specifics: Other Emergency Center ([facility])

## 2022-10-03 NOTE — CT
EXAMINATION TYPE: CT abdomen pelvis w con

 

DATE OF EXAM: 10/3/2022

 

COMPARISON: 7/15/2022

 

HISTORY: abd pain

 

CT DLP: 1130.6 mGycm

Automated exposure control for dose reduction was used.

 

CONTRAST: 

Performed with IV Contrast, patient injected with 100 mL of Isovue 300.

 

Images obtained from the diaphragm to the floor the pelvis with the IV contrast.

 

The lung bases are clear of consolidation. There is mild subsegmental atelectasis at the lung bases. 
No pleural effusion. Heart size is normal. No pericardial effusion. Liver spleen and stomach pancreas
 and gallbladder appear intact. Bilateral nondilated.

 

There is no adrenal mass. Kidneys have normal size. There is bilateral hydronephrosis. There is bilat
eral hydroureter. No retroperitoneal adenopathy.

 

There is a large complex mass in the pelvis on the right side that contains fluid level. This measure
s 18 cm in length and 13 cm in width. There is also extensive subcutaneous air over the lower anterio
r abdomen. According to the history the patient had recently oophorectomy. There are sigmoid divertic
saw. The complex fluid in the pelvis extends into the left and right side pelvic floor.

 

The lumbar vertebra have normal alignment. No compression fracture. There is narrowing at L5-S1 disc.
 The bony pelvis is intact. There is some hypertrophic osteoarthritis and hip joints.

 

No evidence of a pneumoperitoneum.

 

IMPRESSION:

Large complex mass in the pelvis consistent with abscess. This could be peridiverticular abscess. The
 old CT scan of 7/15/2022 appears to show 4 cm peridiverticular abscess of the mid sigmoid colon on t
he left lateral pelvis and large abscess on today's exam is a continuation of this abnormality..

 

Extensive subcutaneous soft tissue air over the anterior abdomen bilaterally and consistent with lapa
roscopic recent surgery.

 

Bilateral hydronephrosis and hydroureter probably related to distal ureteral obstruction related to t
he pelvic abscess.

 

Exam was discussed with emergency room attending staff at 11:00 PM.

## 2022-10-03 NOTE — XR
EXAMINATION TYPE: XR chest 1V portable

 

DATE OF EXAM: 10/3/2022

 

COMPARISON: 7/15/2022

 

HISTORY: Abdominal pain

 

TECHNIQUE: Single view

 

FINDINGS: There is no heart failure. There is small linear density right lung base. There are no marcos
r masses. Heart size is normal. No pleural effusion.

 

IMPRESSION: Subsegmental atelectasis at the right lung base appears new compared to old exam. Normal 
heart.

## 2022-10-04 VITALS — HEART RATE: 112 BPM | SYSTOLIC BLOOD PRESSURE: 130 MMHG | RESPIRATION RATE: 18 BRPM | DIASTOLIC BLOOD PRESSURE: 85 MMHG

## 2022-12-01 ENCOUNTER — HOSPITAL ENCOUNTER (EMERGENCY)
Dept: HOSPITAL 47 - EC | Age: 67
Discharge: HOME | End: 2022-12-01
Payer: MEDICARE

## 2022-12-01 VITALS — HEART RATE: 64 BPM

## 2022-12-01 VITALS — DIASTOLIC BLOOD PRESSURE: 81 MMHG | SYSTOLIC BLOOD PRESSURE: 142 MMHG

## 2022-12-01 VITALS — RESPIRATION RATE: 18 BRPM | TEMPERATURE: 98 F

## 2022-12-01 DIAGNOSIS — Z79.02: ICD-10-CM

## 2022-12-01 DIAGNOSIS — N39.0: Primary | ICD-10-CM

## 2022-12-01 DIAGNOSIS — E78.5: ICD-10-CM

## 2022-12-01 LAB
ALBUMIN SERPL-MCNC: 4.4 G/DL (ref 3.5–5)
ALP SERPL-CCNC: 92 U/L (ref 38–126)
ALT SERPL-CCNC: 17 U/L (ref 4–34)
AMYLASE SERPL-CCNC: 55 U/L (ref 30–110)
ANION GAP SERPL CALC-SCNC: 9 MMOL/L
AST SERPL-CCNC: 31 U/L (ref 14–36)
BASOPHILS # BLD AUTO: 0 K/UL (ref 0–0.2)
BASOPHILS NFR BLD AUTO: 1 %
BUN SERPL-SCNC: 11 MG/DL (ref 7–17)
CALCIUM SPEC-MCNC: 9.7 MG/DL (ref 8.4–10.2)
CHLORIDE SERPL-SCNC: 108 MMOL/L (ref 98–107)
CO2 SERPL-SCNC: 24 MMOL/L (ref 22–30)
EOSINOPHIL # BLD AUTO: 0.1 K/UL (ref 0–0.7)
EOSINOPHIL NFR BLD AUTO: 1 %
ERYTHROCYTE [DISTWIDTH] IN BLOOD BY AUTOMATED COUNT: 4.51 M/UL (ref 3.8–5.4)
ERYTHROCYTE [DISTWIDTH] IN BLOOD: 15.1 % (ref 11.5–15.5)
GLUCOSE SERPL-MCNC: 92 MG/DL (ref 74–99)
HCT VFR BLD AUTO: 38.9 % (ref 34–46)
HGB BLD-MCNC: 12.7 GM/DL (ref 11.4–16)
LIPASE SERPL-CCNC: 160 U/L (ref 23–300)
LYMPHOCYTES # SPEC AUTO: 2.2 K/UL (ref 1–4.8)
LYMPHOCYTES NFR SPEC AUTO: 24 %
MCH RBC QN AUTO: 28.2 PG (ref 25–35)
MCHC RBC AUTO-ENTMCNC: 32.7 G/DL (ref 31–37)
MCV RBC AUTO: 86.2 FL (ref 80–100)
MONOCYTES # BLD AUTO: 0.6 K/UL (ref 0–1)
MONOCYTES NFR BLD AUTO: 6 %
NEUTROPHILS # BLD AUTO: 6.4 K/UL (ref 1.3–7.7)
NEUTROPHILS NFR BLD AUTO: 68 %
PH UR: 5.5 [PH] (ref 5–8)
PLATELET # BLD AUTO: 273 K/UL (ref 150–450)
POTASSIUM SERPL-SCNC: 4.3 MMOL/L (ref 3.5–5.1)
PROT SERPL-MCNC: 7.6 G/DL (ref 6.3–8.2)
RBC UR QL: 15 /HPF (ref 0–5)
SODIUM SERPL-SCNC: 141 MMOL/L (ref 137–145)
SP GR UR: 1.04 (ref 1–1.03)
SQUAMOUS UR QL AUTO: 1 /HPF (ref 0–4)
UROBILINOGEN UR QL STRIP: <2 MG/DL (ref ?–2)
WBC # BLD AUTO: 9.4 K/UL (ref 3.8–10.6)
WBC # UR AUTO: >182 /HPF (ref 0–5)

## 2022-12-01 PROCEDURE — 85025 COMPLETE CBC W/AUTO DIFF WBC: CPT

## 2022-12-01 PROCEDURE — 83690 ASSAY OF LIPASE: CPT

## 2022-12-01 PROCEDURE — 36415 COLL VENOUS BLD VENIPUNCTURE: CPT

## 2022-12-01 PROCEDURE — 87086 URINE CULTURE/COLONY COUNT: CPT

## 2022-12-01 PROCEDURE — 96365 THER/PROPH/DIAG IV INF INIT: CPT

## 2022-12-01 PROCEDURE — 80053 COMPREHEN METABOLIC PANEL: CPT

## 2022-12-01 PROCEDURE — 99284 EMERGENCY DEPT VISIT MOD MDM: CPT

## 2022-12-01 PROCEDURE — 81001 URINALYSIS AUTO W/SCOPE: CPT

## 2022-12-01 PROCEDURE — 82150 ASSAY OF AMYLASE: CPT

## 2022-12-01 PROCEDURE — 74177 CT ABD & PELVIS W/CONTRAST: CPT

## 2022-12-01 NOTE — CT
EXAMINATION TYPE: CT abdomen pelvis w con

 

DATE OF EXAM: 12/1/2022

 

COMPARISON: 10/3/2022

 

HISTORY: pain

 

CT DLP: 981.6 mGycm

 

CONTRAST: 

CT scan of the abdomen and pelvis is performed without Oral Contrast and with IV Contrast, patient in
jected with 100 mL of Isovue 300.

 

FINDINGS: 

LUNG BASES-: No visible nodule.  No infiltrate. 

 

LIVER/GB:   No calcified gallstones.  No space occupying hepatic lesion. Biliary tree is of normal ca
liber. 

 

PANCREAS:  No inflammation.  No distinct mass. 

 

SPLEEN:  No splenic enlargement.  No lesion seen. 

 

ADRENALS:  No nodule.  No thickening. 

 

KIDNEYS/BLADDER: There is mild-to-moderate bilateral hydronephrosis with hydroureter. No definite obs
tructing calculus. No nephrolithiasis.  No distinct renal mass. Small amount of air is seen within th
e urinary bladder lumen. The dome of the urinary bladder demonstrates thickening.

 

BOWEL: Normal appendix.  Normal bowel caliber. There is wall thickening of the sigmoid colon which co
uld reflect residual inflammatory change or active inflammation is not excluded. 

 

GENITAL ORGANS:  Previously noted large pelvic abscess is no longer visible. There is spiculated dens
ity within the right hemipelvis with central increased density which may reflect postoperative change
. No evidence for left ovarian or adnexal mass. Uterus appears to be surgically absent. 

 

LYMPH NODES:  No greater than 1cm abdominal or pelvic lymph nodes are appreciated.

 

AORTA: No significant abnormality. 

 

OSSEOUS STRUCTURES:  No significant abnormality is seen. 

 

OTHER:  No significant additional abnormality is seen. 

 

IMPRESSION: 

1. No evidence for abscess at this time. Previously noted abscess is no longer evident.

2. There is wall thickening at the dome of the urinary bladder with  soft tissue attenuation seen ext
ending from the site of prior abscess. There is also air within the urinary bladder lumen. I cannot e
xclude a fistula arising from the small or large bowel. Correlate for recent catheterization.

3. Wall thickening involving the sigmoid colon. I cannot exclude colitis although the findings could 
be related to prior inflammatory change.

4. Bilateral hydronephrosis progressive since prior study. No obstructing calculus visible at this ti
me.

## 2022-12-01 NOTE — ED
Abdominal Pain HPI





- General


Chief Complaint: Abdominal Pain


Stated Complaint: abd pain


Time Seen by Provider: 12/01/22 14:39


Source: patient


Mode of arrival: ambulatory


Limitations: no limitations





- History of Present Illness


Initial Comments: 





This patient is a 67-year-old woman who presents with complaint of abdominal 

pain.  She states that this is been going back about 3 weeks.  Patient did have 

ovarian mass resected at a mono since to tonight.  She states that the 

postoperative course was complicated by what sounds like a pelvic or abdominal 

hematoma formation.  They did have to evacuate the blood clot at, [facility].  

She states that since that time she has been having mainly low abdominal 

fullness and cramping pain.  She also has noted that when she urinates she 

passes gas mixed with the urine stream at times.  She states that it sounds like

a dark cracking when she urinates.  She has not noted change in bowel movements 

other than there is probably a degree of constipation.  She is having less 

frequent bowel movements.  Patient has not noted worsening or relieving factors 

for the abdominal pain.  She has not had fever or chills.


MD Complaint: abdominal pain


Onset/Timing: 3


-: week(s)


Location: LLQ, RLQ, suprapubic


Radiation: none


Migration to: no migration


Severity: moderate


Quality: cramping, fullness


Consistency: colicky


Improves With: nothing


Worsens With: nothing


Associated Symptoms: other





- Related Data


                                Home Medications











 Medication  Instructions  Recorded  Confirmed


 


Atorvastatin [Lipitor] 10 mg PO DAILY 11/13/20 07/15/22








                                  Previous Rx's











 Medication  Instructions  Recorded


 


Ciprofloxacin HCl [Cipro] 500 mg PO BID 10 Days #20 tab 07/19/22


 


metroNIDAZOLE [Flagyl] 500 mg PO TID #30 tab 07/19/22


 


Amoxic-Pot Clav 875-125Mg 1 tab PO Q12HR 1 Days #14 tab 12/01/22





[Augmentin 875-125]  


 


Peg 3350 (236 gm/Btl) + Lytes 4,000 ml PO AS DIRECTED #1 each 12/01/22





[Golytely Lavage]  











                                    Allergies











Allergy/AdvReac Type Severity Reaction Status Date / Time


 


No Known Allergies Allergy   Verified 12/01/22 11:31














Review of Systems


ROS Statement: 


Those systems with pertinent positive or pertinent negative responses have been 

documented in the HPI.





ROS Other: All systems not noted in ROS Statement are negative.


Constitutional: Denies: fever, chills, weakness


Respiratory: Denies: cough, dyspnea


Cardiovascular: Denies: chest pain, palpitations, edema, syncope


Gastrointestinal: Reports: as per HPI, abdominal pain, nausea, constipation.  

Denies: vomiting, diarrhea, melena, hematochezia


Genitourinary: Reports: as per HPI, frequency, other (Gas in her urine).  

Denies: dysuria, hematuria


Skin: Denies: rash


Neurological: Denies: headache, weakness, numbness





Past Medical History


Past Medical History: GERD/Reflux, Hyperlipidemia, Osteoarthritis (OA)


Additional Past Medical History / Comment(s): Cataracts. Patient states she has 

planned surgery for cataract removal in the next few weeks. seasonal allergies, 

covid 8/2022


History of Any Multi-Drug Resistant Organisms: None Reported


Past Surgical History: Hysterectomy, Joint Replacement


Additional Past Surgical History / Comment(s): rt foot bunionectomy, ovaries


Past Anesthesia/Blood Transfusion Reactions: Previous Problems w/ Anesthesia, 

Motion Sickness


Additional Past Anesthesia/Blood Transfusion Reaction / Comment(s): hard time 

waking up


Past Psychological History: No Psychological Hx Reported


Smoking Status: Never smoker


Past Alcohol Use History: None Reported


Past Drug Use History: None Reported





- Past Family History


  ** Father


Family Medical History: Deep Vein Thrombosis (DVT)





  ** Son(s)


Family Medical History: Cancer





General Exam


Limitations: no limitations


General appearance: alert, in no apparent distress


Head exam: Present: atraumatic, normocephalic


Eye exam: Present: normal appearance.  Absent: scleral icterus, conjunctival i

njection


ENT exam: Present: normal oropharynx


Neck exam: Present: normal inspection


Respiratory exam: Present: normal lung sounds bilaterally.  Absent: respiratory 

distress, wheezes, rales, rhonchi, stridor


Cardiovascular Exam: Present: regular rate, normal rhythm, normal heart sounds. 

Absent: systolic murmur, diastolic murmur, rubs, gallop


GI/Abdominal exam: Present: soft, normal bowel sounds.  Absent: distended, 

tenderness, guarding, rebound, rigid, mass, pulsatile mass, hernia


Extremities exam: Present: normal inspection, normal capillary refill.  Absent: 

pedal edema, calf tenderness


Back exam: Present: normal inspection.  Absent: CVA tenderness (R), CVA 

tenderness (L)


Neurological exam: Present: alert


Skin exam: Present: warm, dry, intact, normal color.  Absent: rash





Course


                                   Vital Signs











  12/01/22 12/01/22 12/01/22





  11:27 16:54 17:15


 


Temperature 98 F  


 


Pulse Rate 82 64 64


 


Respiratory 18 18 18





Rate   


 


Blood Pressure 142/85 132/87 142/81


 


O2 Sat by Pulse 97 98 96





Oximetry   














Medical Decision Making





- Medical Decision Making





This patient is 67-year-old woman here with abdominal pain and urinary 

complaints.  Workup does reveal urinary tract infection as well as small amount 

of gas in the bladder.  I discussed the findings with the patient's surgeon, Dr. Juan Anand [facility], who states that he is currently working is 

issue up and has further testing scheduled.  Patient will go home with 

antibiotics and follow with Dr. Anand.  Discussed appropriate follow-up and 

return parameters.





- Lab Data


Result diagrams: 


                                 12/01/22 11:45





                                 12/01/22 11:45


                                   Lab Results











  12/01/22 12/01/22 12/01/22 Range/Units





  11:45 11:45 15:01 


 


WBC  9.4    (3.8-10.6)  k/uL


 


RBC  4.51    (3.80-5.40)  m/uL


 


Hgb  12.7  D    (11.4-16.0)  gm/dL


 


Hct  38.9    (34.0-46.0)  %


 


MCV  86.2    (80.0-100.0)  fL


 


MCH  28.2    (25.0-35.0)  pg


 


MCHC  32.7    (31.0-37.0)  g/dL


 


RDW  15.1    (11.5-15.5)  %


 


Plt Count  273    (150-450)  k/uL


 


MPV  8.5    


 


Neutrophils %  68    %


 


Lymphocytes %  24    %


 


Monocytes %  6    %


 


Eosinophils %  1    %


 


Basophils %  1    %


 


Neutrophils #  6.4    (1.3-7.7)  k/uL


 


Lymphocytes #  2.2    (1.0-4.8)  k/uL


 


Monocytes #  0.6    (0-1.0)  k/uL


 


Eosinophils #  0.1    (0-0.7)  k/uL


 


Basophils #  0.0    (0-0.2)  k/uL


 


Hypochromasia  Slight    


 


Sodium   141   (137-145)  mmol/L


 


Potassium   4.3   (3.5-5.1)  mmol/L


 


Chloride   108 H   ()  mmol/L


 


Carbon Dioxide   24   (22-30)  mmol/L


 


Anion Gap   9   mmol/L


 


BUN   11   (7-17)  mg/dL


 


Creatinine   0.61   (0.52-1.04)  mg/dL


 


Est GFR (CKD-EPI)AfAm   >90   (>60 ml/min/1.73 sqM)  


 


Est GFR (CKD-EPI)NonAf   >90   (>60 ml/min/1.73 sqM)  


 


Glucose   92   (74-99)  mg/dL


 


Calcium   9.7   (8.4-10.2)  mg/dL


 


Total Bilirubin   0.7   (0.2-1.3)  mg/dL


 


AST   31   (14-36)  U/L


 


ALT   17   (4-34)  U/L


 


Alkaline Phosphatase   92   ()  U/L


 


Total Protein   7.6   (6.3-8.2)  g/dL


 


Albumin   4.4   (3.5-5.0)  g/dL


 


Amylase   55   ()  U/L


 


Lipase   160   ()  U/L


 


Urine Color    Light Yellow  


 


Urine Appearance    Turbid H  (Clear)  


 


Urine pH    5.5  (5.0-8.0)  


 


Ur Specific Gravity    1.036 H  (1.001-1.035)  


 


Urine Protein    Trace H  (Negative)  


 


Urine Glucose (UA)    Negative  (Negative)  


 


Urine Ketones    Negative  (Negative)  


 


Urine Blood    Small H  (Negative)  


 


Urine Nitrite    Negative  (Negative)  


 


Urine Bilirubin    Negative  (Negative)  


 


Urine Urobilinogen    <2.0  (<2.0)  mg/dL


 


Ur Leukocyte Esterase    Large H  (Negative)  


 


Urine RBC    15 H  (0-5)  /hpf


 


Urine WBC    >182 H  (0-5)  /hpf


 


Urine WBC Clumps    Moderate H  (None)  /hpf


 


Ur Squamous Epith Cells    1  (0-4)  /hpf


 


Urine Bacteria    Rare H  (None)  /hpf


 


Urine Mucus    Rare H  (None)  /hpf














Disposition


Clinical Impression: 


 Urinary tract infection





Narrative: 





Possible colitis versus surgical change


Disposition: HOME SELF-CARE


Condition: Fair


Instructions (If sedation given, give patient instructions):  Urinary Tract 

Infection in Women (ED)


Prescriptions: 


Amoxic-Pot Clav 875-125Mg [Augmentin 875-125] 1 tab PO Q12HR 1 Days #14 tab


Peg 3350 (236 gm/Btl) + Lytes [Golytely Lavage] 4,000 ml PO AS DIRECTED #1 each


Is patient prescribed a controlled substance at d/c from ED?: No


Referrals: 


Dilan Hopper MD [Primary Care Provider] - 1-2 days

## 2022-12-08 ENCOUNTER — HOSPITAL ENCOUNTER (EMERGENCY)
Dept: HOSPITAL 47 - EC | Age: 67
LOS: 1 days | Discharge: HOME | End: 2022-12-09
Payer: MEDICARE

## 2022-12-08 VITALS — TEMPERATURE: 97.8 F

## 2022-12-08 DIAGNOSIS — M19.90: ICD-10-CM

## 2022-12-08 DIAGNOSIS — K21.9: ICD-10-CM

## 2022-12-08 DIAGNOSIS — N32.1: Primary | ICD-10-CM

## 2022-12-08 DIAGNOSIS — Z79.899: ICD-10-CM

## 2022-12-08 DIAGNOSIS — E78.5: ICD-10-CM

## 2022-12-08 DIAGNOSIS — K56.7: ICD-10-CM

## 2022-12-08 DIAGNOSIS — R11.2: ICD-10-CM

## 2022-12-08 PROCEDURE — 99284 EMERGENCY DEPT VISIT MOD MDM: CPT

## 2022-12-08 PROCEDURE — 36415 COLL VENOUS BLD VENIPUNCTURE: CPT

## 2022-12-08 PROCEDURE — 96374 THER/PROPH/DIAG INJ IV PUSH: CPT

## 2022-12-08 PROCEDURE — 85025 COMPLETE CBC W/AUTO DIFF WBC: CPT

## 2022-12-08 PROCEDURE — 87077 CULTURE AEROBIC IDENTIFY: CPT

## 2022-12-08 PROCEDURE — 96375 TX/PRO/DX INJ NEW DRUG ADDON: CPT

## 2022-12-08 PROCEDURE — 74176 CT ABD & PELVIS W/O CONTRAST: CPT

## 2022-12-08 PROCEDURE — 87186 SC STD MICRODIL/AGAR DIL: CPT

## 2022-12-08 PROCEDURE — 83690 ASSAY OF LIPASE: CPT

## 2022-12-08 PROCEDURE — 80053 COMPREHEN METABOLIC PANEL: CPT

## 2022-12-08 PROCEDURE — 81001 URINALYSIS AUTO W/SCOPE: CPT

## 2022-12-08 PROCEDURE — 87086 URINE CULTURE/COLONY COUNT: CPT

## 2022-12-08 PROCEDURE — 74018 RADEX ABDOMEN 1 VIEW: CPT

## 2022-12-08 PROCEDURE — 96361 HYDRATE IV INFUSION ADD-ON: CPT

## 2022-12-09 VITALS — SYSTOLIC BLOOD PRESSURE: 135 MMHG | HEART RATE: 84 BPM | DIASTOLIC BLOOD PRESSURE: 82 MMHG

## 2022-12-09 VITALS — RESPIRATION RATE: 16 BRPM

## 2022-12-09 LAB
ALBUMIN SERPL-MCNC: 3.8 G/DL (ref 3.5–5)
ALP SERPL-CCNC: 96 U/L (ref 38–126)
ALT SERPL-CCNC: 13 U/L (ref 4–34)
ANION GAP SERPL CALC-SCNC: 8 MMOL/L
AST SERPL-CCNC: 17 U/L (ref 14–36)
BASOPHILS # BLD AUTO: 0 K/UL (ref 0–0.2)
BASOPHILS NFR BLD AUTO: 0 %
BUN SERPL-SCNC: 8 MG/DL (ref 7–17)
CALCIUM SPEC-MCNC: 9.3 MG/DL (ref 8.4–10.2)
CHLORIDE SERPL-SCNC: 106 MMOL/L (ref 98–107)
CO2 SERPL-SCNC: 24 MMOL/L (ref 22–30)
EOSINOPHIL # BLD AUTO: 0 K/UL (ref 0–0.7)
EOSINOPHIL NFR BLD AUTO: 0 %
ERYTHROCYTE [DISTWIDTH] IN BLOOD BY AUTOMATED COUNT: 4.47 M/UL (ref 3.8–5.4)
ERYTHROCYTE [DISTWIDTH] IN BLOOD: 14.7 % (ref 11.5–15.5)
GLUCOSE SERPL-MCNC: 138 MG/DL (ref 74–99)
HCT VFR BLD AUTO: 37.1 % (ref 34–46)
HGB BLD-MCNC: 12.3 GM/DL (ref 11.4–16)
LIPASE SERPL-CCNC: 45 U/L (ref 23–300)
LYMPHOCYTES # SPEC AUTO: 1.1 K/UL (ref 1–4.8)
LYMPHOCYTES NFR SPEC AUTO: 10 %
MCH RBC QN AUTO: 27.4 PG (ref 25–35)
MCHC RBC AUTO-ENTMCNC: 33 G/DL (ref 31–37)
MCV RBC AUTO: 83 FL (ref 80–100)
MONOCYTES # BLD AUTO: 0.5 K/UL (ref 0–1)
MONOCYTES NFR BLD AUTO: 4 %
NEUTROPHILS # BLD AUTO: 9.1 K/UL (ref 1.3–7.7)
NEUTROPHILS NFR BLD AUTO: 84 %
PH UR: 5.5 [PH] (ref 5–8)
PLATELET # BLD AUTO: 317 K/UL (ref 150–450)
POTASSIUM SERPL-SCNC: 3.7 MMOL/L (ref 3.5–5.1)
PROT SERPL-MCNC: 7 G/DL (ref 6.3–8.2)
PROT UR QL: (no result)
RBC UR QL: 78 /HPF (ref 0–5)
SODIUM SERPL-SCNC: 138 MMOL/L (ref 137–145)
SP GR UR: 1.02 (ref 1–1.03)
SQUAMOUS UR QL AUTO: <1 /HPF (ref 0–4)
UROBILINOGEN UR QL STRIP: <2 MG/DL (ref ?–2)
WBC # BLD AUTO: 10.8 K/UL (ref 3.8–10.6)
WBC # UR AUTO: >182 /HPF (ref 0–5)

## 2022-12-09 NOTE — CT
EXAMINATION TYPE: CT abdomen pelvis wo con

 

DATE OF EXAM: 12/9/2022

 

HISTORY: Vomiting, obstruction

 

CT DLP: 639.2 mGycm.  Automated Exposure Control for Dose Reduction was Utilized.

 

TECHNIQUE:  CT scan of the abdomen and pelvis is performed without oral or IV contrast.

 

COMPARISON: CT abdomen and pelvis 8 days ago and older CT.

 

FINDINGS:  Within the limitations of a non-contrast study, the following observations are made.

 

LUNG BASES: Scattered tiny calcified nodules or benign granulomas redemonstrated.

 

LIVER/GB: Gallbladder remains slightly more hyperdense suggesting gallbladder sludge.

 

PANCREAS: No significant abnormality is seen.

 

SPLEEN: No significant abnormality is seen.

 

ADRENALS: No significant abnormality is seen.

 

KIDNEYS: Persistent moderate to severe bilateral hydronephrosis. Nondependent air in bladder redemons
trated which is mild to moderate concentric wall thickening.

 

BOWEL: Suboptimal evaluation without enteric contrast. Small to moderate-sized hiatal hernia redemons
trated. Stomach and duodenal sweep is not suspiciously dilated. Small bowel loops is not significantl
y dilated. There is fluid prominent and dilated colon with air-fluid level extending from cecum to re
ctum

 

GENITAL ORGANS: Uterus surgically absent or markedly atrophic. Persistent right adnexal spiculated ar
ea measuring approximately 4.1 x 1.8 cm axial image 67 with some ill-defined fluid and fat stranding 
throughout the pelvis. Scattered bilateral pelvic phleboliths redemonstrated inferior to this.

 

LYMPH NODES: No greater than 1cm abdominal or pelvic lymph nodes are appreciated.

 

OSSEOUS STRUCTURES: Moderate to severe disc space narrowing lumbosacral junction.

 

OTHER: No significant additional abnormality is seen.

 

IMPRESSION:

1. CT findings suggest diffuse colonic ileus with fluid prominent and dilated colon but no significan
t stomach or small bowel dilatation.

2. Persistent posttreatment change of the pelvis. Scar tissue is suspected with adhesions possibly ca
using mass effect or partial obstruction. Underlying bladder infection cannot be excluded. Possible b
ladder fistula. Correlate clinically.

## 2022-12-09 NOTE — XR
EXAMINATION TYPE: XR KUB

 

DATE OF EXAM: 12/9/2022

 

COMPARISON: 7/15/2022

 

HISTORY: Abdominal pain

 

TECHNIQUE: 2 views upright

 

FINDINGS: There are some large bowel fluid levels in the left and right abdomen. No sign of free air.
 Lung bases are clear. No pathologic calcifications over the kidneys.

 

IMPRESSION: Large small fluid levels without significant dilation could relate to diarrhea. This is n
ew compared to old exam. No free air.

## 2022-12-09 NOTE — ED
General Adult HPI





- General


Chief complaint: Nausea/Vomiting/Diarrhea


Stated complaint: vomiting,reaction to meds


Time Seen by Provider: 12/09/22 03:06


Source: patient


Mode of arrival: ambulatory


Limitations: no limitations





- History of Present Illness


Initial comments: 





67-year-old female with past history of recently removed benign ovarian mass 

with subsequent pelvic abscess/hematoma who presents emergency Department with 

nausea and vomiting.  Patient is being worked up for a colovesicular fistula.  

She was referred by Dr. Anand to a urologist to is going to be performing a 

cystogram morning at 10:30.  States that she attempted to do the prep last 

night.  She began taking the MiraLAX and some stool softeners.  States that she 

only got part way through her prep when she began having intense abdominal pain 

and vomiting.  She states that she threw up for close to 9 hours before coming 

into the emergency department.  She has been having some stools.  Denies black 

or bloody stools.  States that she was unable to clear herself completely there

fore is concerned that she will not be able to have her procedure this morning. 

She denies any changes in her urination.  No fevers.  No sick contacts.  No 

other alleviating, precipitating or modifying factors





- Related Data


                                Home Medications











 Medication  Instructions  Recorded  Confirmed


 


Atorvastatin [Lipitor] 10 mg PO DAILY 11/13/20 07/15/22








                                  Previous Rx's











 Medication  Instructions  Recorded


 


Ciprofloxacin HCl [Cipro] 500 mg PO BID 10 Days #20 tab 07/19/22


 


metroNIDAZOLE [Flagyl] 500 mg PO TID #30 tab 07/19/22


 


Amoxic-Pot Clav 875-125Mg 1 tab PO Q12HR 1 Days #14 tab 12/01/22





[Augmentin 875-125]  


 


Peg 3350 (236 gm/Btl) + Lytes 4,000 ml PO AS DIRECTED #1 each 12/01/22





[Golytely Lavage]  











                                    Allergies











Allergy/AdvReac Type Severity Reaction Status Date / Time


 


No Known Allergies Allergy   Verified 12/08/22 23:53














Review of Systems


ROS Statement: 


Those systems with pertinent positive or pertinent negative responses have been 

documented in the HPI.





ROS Other: All systems not noted in ROS Statement are negative.





Past Medical History


Past Medical History: GERD/Reflux, Hyperlipidemia, Osteoarthritis (OA)


Additional Past Medical History / Comment(s): Cataracts. Patient states she has 

planned surgery for cataract removal in the next few weeks. seasonal allergies, 

covid 8/2022


History of Any Multi-Drug Resistant Organisms: None Reported


Past Surgical History: Hysterectomy, Joint Replacement


Additional Past Surgical History / Comment(s): rt foot bunionectomy, ovaries


Past Anesthesia/Blood Transfusion Reactions: Previous Problems w/ Anesthesia, 

Motion Sickness


Additional Past Anesthesia/Blood Transfusion Reaction / Comment(s): hard time 

waking up


Past Psychological History: No Psychological Hx Reported


Smoking Status: Never smoker


Past Alcohol Use History: Rare


Past Drug Use History: None Reported





- Past Family History


  ** Father


Family Medical History: Deep Vein Thrombosis (DVT)





  ** Son(s)


Family Medical History: Cancer





General Exam


Limitations: no limitations


General appearance: alert, in no apparent distress


Head exam: Present: atraumatic, normocephalic, normal inspection


Eye exam: Present: normal appearance, PERRL, EOMI.  Absent: scleral icterus, 

conjunctival injection, periorbital swelling


ENT exam: Present: normal exam, mucous membranes moist


Neck exam: Present: normal inspection.  Absent: tenderness, meningismus, 

lymphadenopathy


Respiratory exam: Present: normal lung sounds bilaterally.  Absent: respiratory 

distress, wheezes, rales, rhonchi, stridor


Cardiovascular Exam: Present: regular rate, normal rhythm, normal heart sounds. 

Absent: systolic murmur, diastolic murmur, rubs, gallop, clicks


GI/Abdominal exam: Present: soft, normal bowel sounds.  Absent: distended, 

tenderness, guarding, rebound, rigid


Extremities exam: Present: normal inspection, full ROM, normal capillary refill.

 Absent: tenderness, pedal edema, joint swelling, calf tenderness


Back exam: Present: normal inspection


Neurological exam: Present: alert, oriented X3, CN II-XII intact


Psychiatric exam: Present: normal affect, normal mood


Skin exam: Present: warm, dry, intact, normal color.  Absent: rash





Course


                                   Vital Signs











  12/08/22 12/09/22 12/09/22





  23:51 04:39 06:03


 


Temperature 97.8 F  


 


Pulse Rate 100 89 90


 


Respiratory 18 16 16





Rate   


 


Blood Pressure 139/93 137/98 150/93


 


O2 Sat by Pulse 97 97 97





Oximetry   














  12/09/22





  08:24


 


Temperature 


 


Pulse Rate 84


 


Respiratory 16





Rate 


 


Blood Pressure 135/82


 


O2 Sat by Pulse 96





Oximetry 














Medical Decision Making





- Medical Decision Making





Upon arrival patient was placed into room 23.  A thorough history and physical 

exam was performed.  IV access was established and laboratory studies are 

conducted.  Patient was given a liter bolus of normal saline and 4 mg of Zofran.

 Laboratory studies are reviewed and demonstrates an abnormal UA.  She is given 

a dose of Rocephin.  KUB is performed which inserts multiple fluid levels.  As 

there is concern for obstruction patient is sent back for a CT which demonstr

ates diffuse colonic ileus with fluid prominent and dilated colon.  Discuss his 

results with the patient.  She continues to have nausea and therefore she is 

given a dose of Reglan and Benadryl.  I did speak with the patient's surgeon Dr. Anand.  States that the patient should proceed downtown if she feels well 

enough and be further evaluated at [facility] where they will complete her 

procedure she feels up to it.  I did discuss this with the patient for which she

was agreeable.  Patient discharged home in stable condition





- Lab Data


Result diagrams: 


                                 12/09/22 04:45





                                 12/09/22 04:45


                                   Lab Results











  12/09/22 12/09/22 12/09/22 Range/Units





  04:45 04:45 06:11 


 


WBC  10.8 H    (3.8-10.6)  k/uL


 


RBC  4.47    (3.80-5.40)  m/uL


 


Hgb  12.3    (11.4-16.0)  gm/dL


 


Hct  37.1    (34.0-46.0)  %


 


MCV  83.0    (80.0-100.0)  fL


 


MCH  27.4    (25.0-35.0)  pg


 


MCHC  33.0    (31.0-37.0)  g/dL


 


RDW  14.7    (11.5-15.5)  %


 


Plt Count  317    (150-450)  k/uL


 


MPV  7.8    


 


Neutrophils %  84    %


 


Lymphocytes %  10    %


 


Monocytes %  4    %


 


Eosinophils %  0    %


 


Basophils %  0    %


 


Neutrophils #  9.1 H    (1.3-7.7)  k/uL


 


Lymphocytes #  1.1    (1.0-4.8)  k/uL


 


Monocytes #  0.5    (0-1.0)  k/uL


 


Eosinophils #  0.0    (0-0.7)  k/uL


 


Basophils #  0.0    (0-0.2)  k/uL


 


Sodium   138   (137-145)  mmol/L


 


Potassium   3.7   (3.5-5.1)  mmol/L


 


Chloride   106   ()  mmol/L


 


Carbon Dioxide   24   (22-30)  mmol/L


 


Anion Gap   8   mmol/L


 


BUN   8   (7-17)  mg/dL


 


Creatinine   0.56   (0.52-1.04)  mg/dL


 


Est GFR (CKD-EPI)AfAm   >90   (>60 ml/min/1.73 sqM)  


 


Est GFR (CKD-EPI)NonAf   >90   (>60 ml/min/1.73 sqM)  


 


Glucose   138 H   (74-99)  mg/dL


 


Calcium   9.3   (8.4-10.2)  mg/dL


 


Total Bilirubin   0.5   (0.2-1.3)  mg/dL


 


AST   17   (14-36)  U/L


 


ALT   13   (4-34)  U/L


 


Alkaline Phosphatase   96   ()  U/L


 


Total Protein   7.0   (6.3-8.2)  g/dL


 


Albumin   3.8   (3.5-5.0)  g/dL


 


Lipase   45   ()  U/L


 


Urine Color    Yellow  


 


Urine Appearance    Turbid H  (Clear)  


 


Urine pH    5.5  (5.0-8.0)  


 


Ur Specific Gravity    1.019  (1.001-1.035)  


 


Urine Protein    1+ H  (Negative)  


 


Urine Glucose (UA)    Negative  (Negative)  


 


Urine Ketones    Trace H  (Negative)  


 


Urine Blood    Moderate H  (Negative)  


 


Urine Nitrite    Negative  (Negative)  


 


Urine Bilirubin    Negative  (Negative)  


 


Urine Urobilinogen    <2.0  (<2.0)  mg/dL


 


Ur Leukocyte Esterase    Large H  (Negative)  


 


Urine RBC    78 H  (0-5)  /hpf


 


Urine WBC    >182 H  (0-5)  /hpf


 


Urine WBC Clumps    Many H  (None)  /hpf


 


Ur Squamous Epith Cells    <1  (0-4)  /hpf


 


Urine Bacteria    Moderate H  (None)  /hpf


 


Urine Mucus    Moderate H  (None)  /hpf














Disposition


Clinical Impression: 


 Colovesical fistula, Ileus, Nausea & vomiting





Disposition: HOME SELF-CARE


Condition: Stable


Instructions (If sedation given, give patient instructions):  Acute Nausea and 

Vomiting (ED)


Additional Instructions: 


Please use the Zofran every 8 hours as needed for nausea.  Proceed down to 

[facility] for your procedure as was directed by Dr. Anand.  Return for 

any new or worsening symptoms


Is patient prescribed a controlled substance at d/c from ED?: No


Referrals: 


Matt Kennedy MD [Primary Care Provider] - 1-2 days


Time of Disposition: 08:18

## 2023-01-20 ENCOUNTER — HOSPITAL ENCOUNTER (OUTPATIENT)
Dept: HOSPITAL 47 - CATHCVL | Age: 68
End: 2023-01-20
Attending: INTERNAL MEDICINE
Payer: MEDICARE

## 2023-01-20 VITALS
DIASTOLIC BLOOD PRESSURE: 69 MMHG | SYSTOLIC BLOOD PRESSURE: 111 MMHG | HEART RATE: 65 BPM | TEMPERATURE: 97.8 F | RESPIRATION RATE: 18 BRPM

## 2023-01-20 DIAGNOSIS — N39.0: Primary | ICD-10-CM

## 2023-01-20 PROCEDURE — 76937 US GUIDE VASCULAR ACCESS: CPT

## 2023-01-20 PROCEDURE — 36410 VNPNXR 3YR/> PHY/QHP DX/THER: CPT

## 2023-01-20 PROCEDURE — 36573 INSJ PICC RS&I 5 YR+: CPT

## 2025-04-09 ENCOUNTER — HOSPITAL ENCOUNTER (EMERGENCY)
Dept: HOSPITAL 47 - EC | Age: 70
Discharge: HOME | End: 2025-04-09
Payer: MEDICARE

## 2025-04-09 VITALS — HEART RATE: 72 BPM | SYSTOLIC BLOOD PRESSURE: 123 MMHG | RESPIRATION RATE: 16 BRPM | DIASTOLIC BLOOD PRESSURE: 80 MMHG

## 2025-04-09 VITALS — TEMPERATURE: 98.4 F

## 2025-04-09 DIAGNOSIS — R10.9: Primary | ICD-10-CM

## 2025-04-09 DIAGNOSIS — Z86.16: ICD-10-CM

## 2025-04-09 LAB
ALBUMIN SERPL-MCNC: 4.6 G/DL (ref 3.5–5)
ALP SERPL-CCNC: 88 U/L (ref 38–126)
ALT SERPL-CCNC: 29 U/L (ref 4–34)
ANION GAP SERPL CALC-SCNC: 12 MMOL/L
AST SERPL-CCNC: 33 U/L (ref 14–36)
BASOPHILS # BLD AUTO: 0.04 10*3/UL (ref 0–0.1)
BASOPHILS NFR BLD AUTO: 0.3 %
BUN SERPL-SCNC: 16 MG/DL (ref 7–17)
CALCIUM SPEC-MCNC: 10.4 MG/DL (ref 8.4–10.2)
CHLORIDE SERPL-SCNC: 102 MMOL/L (ref 98–107)
CO2 SERPL-SCNC: 23 MMOL/L (ref 22–30)
ERYTHROCYTE [DISTWIDTH] IN BLOOD BY AUTOMATED COUNT: 4.98 10*6/UL (ref 4.1–5.2)
ERYTHROCYTE [DISTWIDTH] IN BLOOD: 14.1 % (ref 11.5–14.5)
GLUCOSE SERPL-MCNC: 193 MG/DL (ref 74–99)
HCT VFR BLD AUTO: 45.1 % (ref 37.2–46.3)
HGB BLD-MCNC: 15.4 G/DL (ref 12–15)
LIPASE SERPL-CCNC: 111 U/L (ref 23–300)
LYMPHOCYTES # SPEC AUTO: 1.17 10*3/UL (ref 0.9–5)
LYMPHOCYTES NFR SPEC AUTO: 8.3 %
MCH RBC QN AUTO: 30.9 PG (ref 27–32)
MCHC RBC AUTO-ENTMCNC: 34.1 G/DL (ref 32–37)
MCV RBC AUTO: 90.6 FL (ref 80–97)
MONOCYTES # BLD AUTO: 0.72 10*3/UL (ref 0.2–1)
MONOCYTES NFR BLD AUTO: 5.1 %
NEUTROPHILS # BLD AUTO: 12.09 10*3/UL (ref 1.8–7.7)
NEUTROPHILS NFR BLD AUTO: 85.7 %
PLATELET # BLD AUTO: 286 10*3/UL (ref 140–440)
POTASSIUM SERPL-SCNC: 4.8 MMOL/L (ref 3.5–5.1)
PROT SERPL-MCNC: 7.7 G/DL (ref 6.3–8.2)
SODIUM SERPL-SCNC: 137 MMOL/L (ref 137–145)
WBC # BLD AUTO: 14.11 10*3/UL (ref 4.5–10)

## 2025-04-09 PROCEDURE — 83690 ASSAY OF LIPASE: CPT

## 2025-04-09 PROCEDURE — 99284 EMERGENCY DEPT VISIT MOD MDM: CPT

## 2025-04-09 PROCEDURE — 74018 RADEX ABDOMEN 1 VIEW: CPT

## 2025-04-09 PROCEDURE — 36415 COLL VENOUS BLD VENIPUNCTURE: CPT

## 2025-04-09 PROCEDURE — 85025 COMPLETE CBC W/AUTO DIFF WBC: CPT

## 2025-04-09 PROCEDURE — 80053 COMPREHEN METABOLIC PANEL: CPT
